# Patient Record
Sex: MALE | Race: OTHER | HISPANIC OR LATINO | URBAN - METROPOLITAN AREA
[De-identification: names, ages, dates, MRNs, and addresses within clinical notes are randomized per-mention and may not be internally consistent; named-entity substitution may affect disease eponyms.]

---

## 2020-12-07 RX ORDER — LISINOPRIL 20 MG/1
TABLET ORAL
Qty: 90 TABLET | Refills: 0 | OUTPATIENT
Start: 2020-12-07

## 2020-12-09 ENCOUNTER — TELEMEDICINE (OUTPATIENT)
Dept: FAMILY MEDICINE CLINIC | Facility: CLINIC | Age: 55
End: 2020-12-09
Payer: COMMERCIAL

## 2020-12-09 VITALS — WEIGHT: 285 LBS | BODY MASS INDEX: 43.19 KG/M2 | HEIGHT: 68 IN

## 2020-12-09 DIAGNOSIS — Z11.4 SCREENING FOR HIV WITHOUT PRESENCE OF RISK FACTORS: ICD-10-CM

## 2020-12-09 DIAGNOSIS — Z12.11 SCREENING FOR COLON CANCER: ICD-10-CM

## 2020-12-09 DIAGNOSIS — E55.9 VITAMIN D DEFICIENCY: ICD-10-CM

## 2020-12-09 DIAGNOSIS — Z13.220 SCREENING FOR LIPID DISORDERS: ICD-10-CM

## 2020-12-09 DIAGNOSIS — I10 ESSENTIAL HYPERTENSION: Primary | ICD-10-CM

## 2020-12-09 DIAGNOSIS — Z13.89 SCREENING FOR BLOOD OR PROTEIN IN URINE: ICD-10-CM

## 2020-12-09 PROCEDURE — 99213 OFFICE O/P EST LOW 20 MIN: CPT | Performed by: NURSE PRACTITIONER

## 2020-12-09 RX ORDER — LISINOPRIL 20 MG/1
20 TABLET ORAL DAILY
Qty: 90 TABLET | Refills: 1 | Status: SHIPPED | OUTPATIENT
Start: 2020-12-09 | End: 2021-05-12 | Stop reason: SDUPTHER

## 2020-12-09 RX ORDER — LISINOPRIL 20 MG/1
TABLET ORAL
Qty: 90 TABLET | Refills: 0 | OUTPATIENT
Start: 2020-12-09

## 2020-12-28 DIAGNOSIS — G47.33 OSA (OBSTRUCTIVE SLEEP APNEA): ICD-10-CM

## 2020-12-28 DIAGNOSIS — J34.3 HYPERTROPHY OF INFERIOR NASAL TURBINATE: ICD-10-CM

## 2020-12-28 DIAGNOSIS — J30.89 NON-SEASONAL ALLERGIC RHINITIS DUE TO OTHER ALLERGIC TRIGGER: ICD-10-CM

## 2020-12-28 DIAGNOSIS — J34.2 DEVIATED NASAL SEPTUM: ICD-10-CM

## 2020-12-28 DIAGNOSIS — R09.81 NASAL CONGESTION: ICD-10-CM

## 2020-12-31 RX ORDER — FLUTICASONE PROPIONATE 50 MCG
SPRAY, SUSPENSION (ML) NASAL
Qty: 16 G | Refills: 0 | Status: SHIPPED | OUTPATIENT
Start: 2020-12-31 | End: 2021-05-12 | Stop reason: SDUPTHER

## 2021-02-05 LAB
25(OH)D3 SERPL-MCNC: 32 NG/ML (ref 30–100)
ALBUMIN SERPL-MCNC: 3.8 G/DL (ref 3.6–5.1)
ALBUMIN/GLOB SERPL: 1.2 (CALC) (ref 1–2.5)
ALP SERPL-CCNC: 48 U/L (ref 35–144)
ALT SERPL-CCNC: 27 U/L (ref 9–46)
APPEARANCE UR: CLEAR
AST SERPL-CCNC: 31 U/L (ref 10–35)
BACTERIA UR QL AUTO: NORMAL /HPF
BASOPHILS # BLD AUTO: 13 CELLS/UL (ref 0–200)
BASOPHILS NFR BLD AUTO: 0.2 %
BILIRUB SERPL-MCNC: 0.8 MG/DL (ref 0.2–1.2)
BILIRUB UR QL STRIP: NEGATIVE
BUN SERPL-MCNC: 19 MG/DL (ref 7–25)
BUN/CREAT SERPL: NORMAL (CALC) (ref 6–22)
CALCIUM SERPL-MCNC: 8.7 MG/DL (ref 8.6–10.3)
CHLORIDE SERPL-SCNC: 107 MMOL/L (ref 98–110)
CHOLEST SERPL-MCNC: 153 MG/DL
CHOLEST/HDLC SERPL: 3.8 (CALC)
CO2 SERPL-SCNC: 26 MMOL/L (ref 20–32)
COLOR UR: YELLOW
CREAT SERPL-MCNC: 0.91 MG/DL (ref 0.7–1.33)
EOSINOPHIL # BLD AUTO: 191 CELLS/UL (ref 15–500)
EOSINOPHIL NFR BLD AUTO: 2.9 %
ERYTHROCYTE [DISTWIDTH] IN BLOOD BY AUTOMATED COUNT: 13.9 % (ref 11–15)
GLOBULIN SER CALC-MCNC: 3.1 G/DL (CALC) (ref 1.9–3.7)
GLUCOSE SERPL-MCNC: 92 MG/DL (ref 65–99)
GLUCOSE UR QL STRIP: NEGATIVE
HCT VFR BLD AUTO: 41 % (ref 38.5–50)
HDLC SERPL-MCNC: 40 MG/DL
HGB BLD-MCNC: 13.7 G/DL (ref 13.2–17.1)
HGB UR QL STRIP: NEGATIVE
HIV 1+2 AB+HIV1 P24 AG SERPL QL IA: NORMAL
HYALINE CASTS #/AREA URNS LPF: NORMAL /LPF
KETONES UR QL STRIP: NEGATIVE
LDLC SERPL CALC-MCNC: 99 MG/DL (CALC)
LEUKOCYTE ESTERASE UR QL STRIP: NEGATIVE
LYMPHOCYTES # BLD AUTO: 2845 CELLS/UL (ref 850–3900)
LYMPHOCYTES NFR BLD AUTO: 43.1 %
MCH RBC QN AUTO: 28.3 PG (ref 27–33)
MCHC RBC AUTO-ENTMCNC: 33.4 G/DL (ref 32–36)
MCV RBC AUTO: 84.7 FL (ref 80–100)
MONOCYTES # BLD AUTO: 772 CELLS/UL (ref 200–950)
MONOCYTES NFR BLD AUTO: 11.7 %
NEUTROPHILS # BLD AUTO: 2779 CELLS/UL (ref 1500–7800)
NEUTROPHILS NFR BLD AUTO: 42.1 %
NITRITE UR QL STRIP: NEGATIVE
NONHDLC SERPL-MCNC: 113 MG/DL (CALC)
PH UR STRIP: 5.5 [PH] (ref 5–8)
PLATELET # BLD AUTO: 251 THOUSAND/UL (ref 140–400)
PMV BLD REES-ECKER: 11.2 FL (ref 7.5–12.5)
POTASSIUM SERPL-SCNC: 3.7 MMOL/L (ref 3.5–5.3)
PROT SERPL-MCNC: 6.9 G/DL (ref 6.1–8.1)
PROT UR QL STRIP: NEGATIVE
RBC # BLD AUTO: 4.84 MILLION/UL (ref 4.2–5.8)
RBC #/AREA URNS HPF: NORMAL /HPF
SL AMB EGFR AFRICAN AMERICAN: 110 ML/MIN/1.73M2
SL AMB EGFR NON AFRICAN AMERICAN: 95 ML/MIN/1.73M2
SODIUM SERPL-SCNC: 141 MMOL/L (ref 135–146)
SP GR UR STRIP: 1.03 (ref 1–1.03)
SQUAMOUS #/AREA URNS HPF: NORMAL /HPF
TRIGL SERPL-MCNC: 56 MG/DL
WBC # BLD AUTO: 6.6 THOUSAND/UL (ref 3.8–10.8)
WBC #/AREA URNS HPF: NORMAL /HPF

## 2021-03-09 ENCOUNTER — TELEPHONE (OUTPATIENT)
Dept: FAMILY MEDICINE CLINIC | Facility: CLINIC | Age: 56
End: 2021-03-09

## 2021-03-09 DIAGNOSIS — J30.89 NON-SEASONAL ALLERGIC RHINITIS DUE TO OTHER ALLERGIC TRIGGER: ICD-10-CM

## 2021-03-09 DIAGNOSIS — J34.3 HYPERTROPHY OF INFERIOR NASAL TURBINATE: ICD-10-CM

## 2021-03-09 DIAGNOSIS — R09.81 NASAL CONGESTION: ICD-10-CM

## 2021-03-09 DIAGNOSIS — J34.2 DEVIATED NASAL SEPTUM: ICD-10-CM

## 2021-03-09 DIAGNOSIS — G47.33 OSA (OBSTRUCTIVE SLEEP APNEA): ICD-10-CM

## 2021-03-09 RX ORDER — CETIRIZINE HYDROCHLORIDE 10 MG/1
10 TABLET ORAL
Qty: 30 TABLET | Refills: 2 | Status: SHIPPED | OUTPATIENT
Start: 2021-03-09 | End: 2021-05-12 | Stop reason: SDUPTHER

## 2021-04-16 ENCOUNTER — IMMUNIZATIONS (OUTPATIENT)
Dept: FAMILY MEDICINE CLINIC | Facility: HOSPITAL | Age: 56
End: 2021-04-16

## 2021-04-16 DIAGNOSIS — Z23 ENCOUNTER FOR IMMUNIZATION: Primary | ICD-10-CM

## 2021-04-16 PROCEDURE — 91300 SARS-COV-2 / COVID-19 MRNA VACCINE (PFIZER-BIONTECH) 30 MCG: CPT

## 2021-04-16 PROCEDURE — 0001A SARS-COV-2 / COVID-19 MRNA VACCINE (PFIZER-BIONTECH) 30 MCG: CPT

## 2021-05-11 ENCOUNTER — IMMUNIZATIONS (OUTPATIENT)
Dept: FAMILY MEDICINE CLINIC | Facility: HOSPITAL | Age: 56
End: 2021-05-11

## 2021-05-11 DIAGNOSIS — Z23 ENCOUNTER FOR IMMUNIZATION: Primary | ICD-10-CM

## 2021-05-11 PROCEDURE — 91300 SARS-COV-2 / COVID-19 MRNA VACCINE (PFIZER-BIONTECH) 30 MCG: CPT

## 2021-05-11 PROCEDURE — 0002A SARS-COV-2 / COVID-19 MRNA VACCINE (PFIZER-BIONTECH) 30 MCG: CPT

## 2021-05-12 ENCOUNTER — TELEPHONE (OUTPATIENT)
Dept: FAMILY MEDICINE CLINIC | Facility: CLINIC | Age: 56
End: 2021-05-12

## 2021-05-12 DIAGNOSIS — G47.33 OSA (OBSTRUCTIVE SLEEP APNEA): ICD-10-CM

## 2021-05-12 DIAGNOSIS — I10 ESSENTIAL HYPERTENSION: ICD-10-CM

## 2021-05-12 DIAGNOSIS — R09.81 NASAL CONGESTION: ICD-10-CM

## 2021-05-12 DIAGNOSIS — J34.2 DEVIATED NASAL SEPTUM: ICD-10-CM

## 2021-05-12 DIAGNOSIS — J30.89 NON-SEASONAL ALLERGIC RHINITIS DUE TO OTHER ALLERGIC TRIGGER: ICD-10-CM

## 2021-05-12 DIAGNOSIS — E55.9 VITAMIN D DEFICIENCY: Primary | ICD-10-CM

## 2021-05-12 DIAGNOSIS — J34.3 HYPERTROPHY OF INFERIOR NASAL TURBINATE: ICD-10-CM

## 2021-05-12 RX ORDER — LISINOPRIL 20 MG/1
20 TABLET ORAL DAILY
Qty: 90 TABLET | Refills: 2 | Status: SHIPPED | OUTPATIENT
Start: 2021-05-12 | End: 2021-10-31

## 2021-05-12 RX ORDER — CETIRIZINE HYDROCHLORIDE 10 MG/1
10 TABLET ORAL
Qty: 30 TABLET | Refills: 5 | Status: SHIPPED | OUTPATIENT
Start: 2021-05-12 | End: 2022-01-25

## 2021-05-12 RX ORDER — FLUTICASONE PROPIONATE 50 MCG
1 SPRAY, SUSPENSION (ML) NASAL DAILY
Qty: 16 G | Refills: 2 | Status: SHIPPED | OUTPATIENT
Start: 2021-05-12 | End: 2022-07-15 | Stop reason: SDUPTHER

## 2021-05-12 NOTE — TELEPHONE ENCOUNTER
Spoke with patient and gave results for cologard test  Patient wants to know since he has history of colon polyps and this recent test was negative, how long until he goes back for another colonoscopy? When I call patient back I need to obtain the old records, there were none on file for old colonoscopy

## 2021-10-29 DIAGNOSIS — I10 ESSENTIAL HYPERTENSION: ICD-10-CM

## 2021-10-31 RX ORDER — LISINOPRIL 20 MG/1
TABLET ORAL
Qty: 90 TABLET | Refills: 0 | Status: SHIPPED | OUTPATIENT
Start: 2021-10-31 | End: 2022-07-15 | Stop reason: SDUPTHER

## 2022-01-25 DIAGNOSIS — G47.33 OSA (OBSTRUCTIVE SLEEP APNEA): ICD-10-CM

## 2022-01-25 DIAGNOSIS — J34.3 HYPERTROPHY OF INFERIOR NASAL TURBINATE: ICD-10-CM

## 2022-01-25 DIAGNOSIS — J30.89 NON-SEASONAL ALLERGIC RHINITIS DUE TO OTHER ALLERGIC TRIGGER: ICD-10-CM

## 2022-01-25 DIAGNOSIS — R09.81 NASAL CONGESTION: ICD-10-CM

## 2022-01-25 DIAGNOSIS — J34.2 DEVIATED NASAL SEPTUM: ICD-10-CM

## 2022-01-25 DIAGNOSIS — I10 ESSENTIAL HYPERTENSION: ICD-10-CM

## 2022-01-25 RX ORDER — CETIRIZINE HYDROCHLORIDE 10 MG/1
TABLET ORAL
Qty: 30 TABLET | Refills: 0 | Status: SHIPPED | OUTPATIENT
Start: 2022-01-25

## 2022-01-25 RX ORDER — CETIRIZINE HYDROCHLORIDE 10 MG/1
10 TABLET ORAL
Qty: 30 TABLET | Refills: 5 | OUTPATIENT
Start: 2022-01-25

## 2022-06-28 ENCOUNTER — TELEPHONE (OUTPATIENT)
Dept: FAMILY MEDICINE CLINIC | Facility: CLINIC | Age: 57
End: 2022-06-28

## 2022-06-28 NOTE — TELEPHONE ENCOUNTER
Pt's wife and called again, NAHED Meza had called to let her know that  Because pt has not been seen in 1 5 yrs, and no labs that he needs an OV  Pt's wife states that he is [place] on business and not sure when he'd be returning  After speaking with Blaze Enrique pt's wife that pt should seek medical attention down in [place]

## 2022-06-28 NOTE — TELEPHONE ENCOUNTER
Wife called patient is out of town and out of medication  He needs refills on lisinopril  Cetirizine and nose spray   Sent to [facility]  [phone]

## 2022-07-11 ENCOUNTER — TELEPHONE (OUTPATIENT)
Dept: FAMILY MEDICINE CLINIC | Facility: CLINIC | Age: 57
End: 2022-07-11

## 2022-07-11 NOTE — TELEPHONE ENCOUNTER
Patient has an appt   For Friday and would like to get blood work done ahead of time if he needs it done

## 2022-07-12 RX ORDER — CHLORHEXIDINE GLUCONATE ORAL RINSE 1.2 MG/ML
SOLUTION DENTAL
COMMUNITY
Start: 2022-04-19

## 2022-07-14 NOTE — PROGRESS NOTES
[facility]    NAME: Wesley West  AGE: 56 y o  SEX: male  : 1965     DATE: 7/15/2022     Assessment and Plan:     Problem List Items Addressed This Visit        Respiratory    Deviated nasal septum     Patient reports history of deviated septum  Concerns for sleep apnea sleep study ordered at this time for follow-up  Relevant Medications    fluticasone (FLONASE) 50 mcg/act nasal spray    Hypertrophy of inferior nasal turbinate     Patient re-evaluate for nasal pillows for CPAP machine  Relevant Medications    fluticasone (FLONASE) 50 mcg/act nasal spray    LEAH (obstructive sleep apnea)     Prior history of using CPAP mask for sleep apnea  Consideration for bilateral nasal pillows at this time  Sleep Consul ordered  Relevant Medications    fluticasone (FLONASE) 50 mcg/act nasal spray    Allergic rhinitis due to allergen     Patient order for Flonase nasal spray 1 spray each nostril daily during change of season  Maintain Zyrtec 10 mg p o  Q h s  During changes season as well  Allergy triggers to be avoided  Relevant Medications    fluticasone (FLONASE) 50 mcg/act nasal spray       Cardiovascular and Mediastinum    Essential hypertension     Blood pressure currently 120/80  Patient currently reordered for lisinopril 20 mg p o  Daily  Patient maintain a low-sodium diet as well as exercise 3-5 times per week with moderate cardiovascular activity for 75 minutes  Recheck BP next office visit  Relevant Medications    lisinopril (ZESTRIL) 20 mg tablet       Other    Screening, lipid - Primary     Screening lipids ordered at this time  Patient currently taking cholesteroff over-the-counter herbal supplement to reduce cholesterol  Maintain low-fat low-cholesterol diet routinely  Exercise encouraged 3-5 times per week for 75 minutes of cardiovascular activity           Relevant Orders Lipid panel    Need for hepatitis C screening test     Baseline hepatitis C screening for low risk patients ordered at this time  Relevant Orders    Hepatitis C Antibody ([facility])    Snoring     Known history of snoring Sleep Medicine consult ordered at this time  Relevant Orders    Ambulatory Referral to Sleep Medicine    Exam for population survey     CBC with diff and CMP ordered  Relevant Orders    CBC and differential    Comprehensive metabolic panel    Nasal congestion     Flonase nasal spray ordered for routine seasonal rhinitis  Relevant Medications    fluticasone (FLONASE) 50 mcg/act nasal spray          Immunizations and preventive care screenings were discussed with patient today  Appropriate education was printed on patient's after visit summary  Counseling:  Alcohol/drug use: discussed moderation in alcohol intake, the recommendations for healthy alcohol use, and avoidance of illicit drug use  Dental Health: discussed importance of regular tooth brushing, flossing, and dental visits  Injury prevention: discussed safety/seat belts, safety helmets, smoke detectors, carbon dioxide detectors, and smoking near bedding or upholstery  Sexual health: discussed sexually transmitted diseases, partner selection, use of condoms, avoidance of unintended pregnancy, and contraceptive alternatives  · Exercise: the importance of regular exercise/physical activity was discussed  Recommend exercise 3-5 times per week for at least 30 minutes  Depression Screening and Follow-up Plan: Patient was screened for depression during today's encounter  They screened negative with a PHQ-2 score of 0  No follow-ups on file       Chief Complaint:     Chief Complaint   Patient presents with    Follow-up    Annual Exam      History of Present Illness:     Adult Annual Physical   Patient here for a comprehensive physical exam  The patient reports problems - history sleep apnea with snoring, feel unrested       Diet and Physical Activity  · Diet/Nutrition: well balanced diet, heart healthy (low sodium) diet, limited junk food, low calorie diet, low fat diet, low carb diet, consuming 3-5 servings of fruits/vegetables daily, adequate fiber intake and adequate whole grain intake  · Exercise: no formal exercise  Depression Screening  PHQ-2/9 Depression Screening    Little interest or pleasure in doing things: 0 - not at all  Feeling down, depressed, or hopeless: 0 - not at all  PHQ-2 Score: 0  PHQ-2 Interpretation: Negative depression screen       General Health  · Sleep: sleeps well, gets 7-8 hours of sleep on average, snores loudly, experiences daytime hypersomnolence and unrefreshing sleep  · Hearing: normal - bilateral   · Vision: goes for regular eye exams, most recent eye exam >1 year ago and wears glasses  · Dental: regular dental visits, brushes teeth twice daily and flosses teeth occasionally   Health  · Symptoms include: none     Review of Systems:     Review of Systems   Constitutional: Negative for activity change, appetite change, chills, fatigue, fever and unexpected weight change  HENT: Negative for congestion, ear discharge, ear pain, nosebleeds, postnasal drip, rhinorrhea, sinus pressure, sinus pain, sneezing, sore throat and voice change  Eyes: Negative for pain, redness and visual disturbance  Respiratory: Negative for cough, chest tightness, shortness of breath and wheezing  Cardiovascular: Negative for chest pain and palpitations  Gastrointestinal: Negative for abdominal distention, abdominal pain, constipation, diarrhea, nausea and vomiting  Endocrine: Negative  Genitourinary: Negative for difficulty urinating, dysuria, flank pain, frequency, hematuria and urgency  Musculoskeletal: Negative for arthralgias and myalgias  Skin: Negative  Allergic/Immunologic: Negative  Neurological: Negative  Hematological: Negative  Psychiatric/Behavioral: Negative         Past Medical History:     Past Medical History:   Diagnosis Date    High blood pressure     Hypertension       Past Surgical History:     Past Surgical History:   Procedure Laterality Date    HERNIA REPAIR      WISDOM TOOTH EXTRACTION        Family History:     Family History   Problem Relation Age of Onset    Heart disease Other     Hypertension Other     Diabetes Other     Cancer Other     Dementia Other     Diabetes Mother     Alzheimer's disease Mother     Dementia Mother     Diabetes Father     Hypertension Father       Social History:     Social History     Socioeconomic History    Marital status: /Civil Union     Spouse name: None    Number of children: 2    Years of education: None    Highest education level: None   Occupational History    Occupation: Investigator    Tobacco Use    Smoking status: Never Smoker    Smokeless tobacco: Never Used   Vaping Use    Vaping Use: Never used   Substance and Sexual Activity    Alcohol use: Yes     Comment: socially    Drug use: Never    Sexual activity: None   Other Topics Concern    None   Social History Narrative    · Most recent tobacco use screenin2019      · Do you currently or have you served in the [employer]:   No      · Were you activated, into active duty, as a member of the [employer] or as a Reservist:   No     As per [employer] [facility] [employer]     Financial Resource Strain: Not on [facility] Insecurity: Not on file   Transportation Needs: Not on file   Physical Activity: Not on file   Stress: Not on file   Social Connections: Not on file   Intimate Partner Violence: Not on file   Housing Stability: Not on file      Current Medications:     Current Outpatient Medications   Medication Sig Dispense Refill    cetirizine (ZyrTEC) 10 mg tablet TAKE 1 TABLET BY MOUTH ONCE DAILY AT BEDTIME 30 tablet 0    chlorhexidine (PERIDEX) 0 12 % solution RINSE 30 SECONDS TWICE DAILY AFTER BRUSHING      fluticasone (FLONASE) 50 mcg/act nasal spray 1 spray into each nostril daily 16 g 2    lisinopril (ZESTRIL) 20 mg tablet Take 1 tablet (20 mg total) by mouth daily 90 tablet 3    Cholecalciferol (Vitamin D3) 125 MCG (5000 UT) CHEW Chew 0 4 tablets (2,000 Units total) daily (Patient not taking: Reported on 7/15/2022) 90 tablet 2     No current facility-administered medications for this visit  Allergies:     No Known Allergies   Physical Exam:     There were no vitals taken for this visit  Physical Exam  Vitals and nursing note reviewed  Constitutional:       Appearance: Normal appearance  He is well-developed and normal weight  HENT:      Head: Normocephalic and atraumatic  Right Ear: Ear canal and external ear normal  There is impacted cerumen  Left Ear: Ear canal and external ear normal  There is impacted cerumen  Nose: Nose normal  No congestion or rhinorrhea  Mouth/Throat:      Mouth: Mucous membranes are moist    Eyes:      Extraocular Movements: Extraocular movements intact  Conjunctiva/sclera: Conjunctivae normal       Pupils: Pupils are equal, round, and reactive to light  Cardiovascular:      Rate and Rhythm: Normal rate and regular rhythm  Pulses: Normal pulses  Heart sounds: Normal heart sounds  No murmur heard  Pulmonary:      Effort: Pulmonary effort is normal  No respiratory distress  Breath sounds: Normal breath sounds  Abdominal:      General: Bowel sounds are normal       Palpations: Abdomen is soft  Tenderness: There is no abdominal tenderness  Musculoskeletal:         General: Normal range of motion  Cervical back: Normal range of motion and neck supple  Skin:     General: Skin is warm and dry  Capillary Refill: Capillary refill takes 2 to 3 seconds  Neurological:      General: No focal deficit present  Mental Status: He is alert and oriented to person, place, and time  Psychiatric:         Mood and Affect: Mood normal          Behavior: Behavior normal           Marcella [facility]

## 2022-07-15 ENCOUNTER — OFFICE VISIT (OUTPATIENT)
Dept: FAMILY MEDICINE CLINIC | Facility: CLINIC | Age: 57
End: 2022-07-15
Payer: COMMERCIAL

## 2022-07-15 DIAGNOSIS — Z12.11 COLON CANCER SCREENING: ICD-10-CM

## 2022-07-15 DIAGNOSIS — Z13.29 SCREENING FOR THYROID DISORDER: ICD-10-CM

## 2022-07-15 DIAGNOSIS — I10 ESSENTIAL HYPERTENSION: ICD-10-CM

## 2022-07-15 DIAGNOSIS — G47.33 OSA (OBSTRUCTIVE SLEEP APNEA): ICD-10-CM

## 2022-07-15 DIAGNOSIS — Z23 ENCOUNTER FOR IMMUNIZATION: ICD-10-CM

## 2022-07-15 DIAGNOSIS — Z13.89 SCREENING FOR BLOOD OR PROTEIN IN URINE: ICD-10-CM

## 2022-07-15 DIAGNOSIS — J34.2 DEVIATED NASAL SEPTUM: ICD-10-CM

## 2022-07-15 DIAGNOSIS — J30.89 NON-SEASONAL ALLERGIC RHINITIS DUE TO OTHER ALLERGIC TRIGGER: ICD-10-CM

## 2022-07-15 DIAGNOSIS — Z00.8 EXAM FOR POPULATION SURVEY: ICD-10-CM

## 2022-07-15 DIAGNOSIS — Z11.59 NEED FOR HEPATITIS C SCREENING TEST: ICD-10-CM

## 2022-07-15 DIAGNOSIS — Z00.00 ANNUAL PHYSICAL EXAM: Primary | ICD-10-CM

## 2022-07-15 DIAGNOSIS — R06.83 SNORING: ICD-10-CM

## 2022-07-15 DIAGNOSIS — R09.81 NASAL CONGESTION: ICD-10-CM

## 2022-07-15 DIAGNOSIS — J34.3 HYPERTROPHY OF INFERIOR NASAL TURBINATE: ICD-10-CM

## 2022-07-15 DIAGNOSIS — Z13.220 SCREENING, LIPID: ICD-10-CM

## 2022-07-15 PROBLEM — J30.9 ALLERGIC RHINITIS DUE TO ALLERGEN: Status: ACTIVE | Noted: 2022-07-15

## 2022-07-15 PROCEDURE — 3725F SCREEN DEPRESSION PERFORMED: CPT | Performed by: NURSE PRACTITIONER

## 2022-07-15 PROCEDURE — 90471 IMMUNIZATION ADMIN: CPT | Performed by: NURSE PRACTITIONER

## 2022-07-15 PROCEDURE — 99215 OFFICE O/P EST HI 40 MIN: CPT | Performed by: NURSE PRACTITIONER

## 2022-07-15 PROCEDURE — 90715 TDAP VACCINE 7 YRS/> IM: CPT | Performed by: NURSE PRACTITIONER

## 2022-07-15 PROCEDURE — 99396 PREV VISIT EST AGE 40-64: CPT | Performed by: NURSE PRACTITIONER

## 2022-07-15 RX ORDER — LISINOPRIL 20 MG/1
20 TABLET ORAL DAILY
Qty: 90 TABLET | Refills: 3 | Status: SHIPPED | OUTPATIENT
Start: 2022-07-15

## 2022-07-15 RX ORDER — FLUTICASONE PROPIONATE 50 MCG
1 SPRAY, SUSPENSION (ML) NASAL DAILY
Qty: 16 G | Refills: 2 | Status: SHIPPED | OUTPATIENT
Start: 2022-07-15

## 2022-07-15 NOTE — PATIENT INSTRUCTIONS
Hypertension   WHAT YOU NEED TO KNOW:   What is hypertension? Hypertension is high blood pressure  Your blood pressure is the force of your blood moving against the walls of your arteries  Hypertension causes your blood pressure to get so high that your heart has to work much harder than normal  This can damage your heart  The cause of hypertension may not be known  This is called essential or primary hypertension  Hypertension caused by another medical condition, such as kidney disease, is called secondary hypertension  What do I need to know about the stages of hypertension? Normal blood pressure is 119/79 or lower   Your healthcare provider may only check your blood pressure each year if it stays at a normal level  Elevated blood pressure is 120/79 to 129/79   This is sometimes called prehypertension  Your healthcare provider may suggest lifestyle changes to help lower your blood pressure to a normal level  He or she may then check it again in 3 to 6 months  Stage 1 hypertension is 130/80  to 139/89   Your provider may recommend lifestyle changes, medication, and checks every 3 to 6 months until your blood pressure is controlled  Stage 2 hypertension is 140/90 or higher   Your provider will recommend lifestyle changes and have you take 2 kinds of hypertension medicines  You will also need to have your blood pressure checked monthly until it is controlled  What increases my risk for hypertension? Age older than 54 years (men) or 72 (women)    Stress, or a family history of hypertension or heart disease    Obesity, lack of exercise, or too many high-sodium foods    Use of tobacco, alcohol, or illegal drugs    A medical condition, such as diabetes, kidney disease, thyroid disease, or adrenal gland disorder    Certain medicines, such as steroids or birth control pills    What are the signs and symptoms of hypertension?   You may have no signs or symptoms, or you may have any of the following:  Headache    Blurred vision    Chest pain    Dizziness or weakness    Trouble breathing    Nosebleeds    How is hypertension diagnosed? Your healthcare provider will take your blood pressure at several visits  You may also need to check your blood pressure at home  The provider will examine you and ask what medicines you take  He or she will also ask if you have a family history of high blood pressure and about any health conditions you have  He or she will also check your blood pressure and weight and examine your heart, lungs, and eyes  You may need any of the following tests: An ambulatory blood pressure monitor (ABPM)  is a device that you wear  ABPM measures your blood pressure while you do your regular daily activities  It records your blood pressure every 15 to 30 minutes during the day  It also records your blood pressure every 15 minutes to 1 hour at night  The recorded blood pressures help your healthcare provider know if you have hypertension not seen at your appointment  Blood tests  may help healthcare providers find the cause of your hypertension  Blood tests can also help find other health problems caused by hypertension  Urine tests  will be done to check your kidney function  Kidney problems can increase your risk for hypertension  Which medicines are used to treat hypertension? Antihypertensives  may be used to help lower your blood pressure  Several kinds of medicines are available  Your healthcare provider will choose medicines based on the kind of hypertension you have  You may need more than one type of medicine  Take the medicine exactly as directed  Diuretics  help decrease extra fluid that collects in your body  This will help lower your blood pressure  You may urinate more often while you take this medicine  Cholesterol medicine  helps lower your cholesterol level   A low cholesterol level helps prevent heart disease and makes it easier to control your blood pressure  What can I do to manage hypertension? Check your blood pressure at home  Avoid smoking, caffeine, and exercise at least 30 minutes before checking your blood pressure  Sit and rest for 5 minutes before you take your blood pressure  Extend your arm and support it on a flat surface  Your arm should be at the same level as your heart  Follow the directions that came with your blood pressure monitor  Check your blood pressure 2 times, 1 minute apart, before you take your medicine in the morning  Also check your blood pressure before your evening meal  Keep a record of your readings and bring it to your follow-up visits  Ask your healthcare provider what your blood pressure should be  Manage any other health conditions you have  Health conditions such as diabetes can increase your risk for hypertension  Follow your healthcare provider's instructions and take all your medicines as directed  Ask about all medicines  Certain medicines can increase your blood pressure  Examples include oral birth control pills, decongestants, herbal supplements, and NSAIDs, such as ibuprofen  Your healthcare provider can tell you which medicines are safe for you to take  This includes prescription and over-the-counter medicines  What lifestyle changes can I make to manage hypertension? Your healthcare provider may recommend you work with a team to manage hypertension  The team may include medical experts such as a dietitian, an exercise or physical therapist, and a behavior therapist  Your family members may be included in helping you create lifestyle changes  Limit sodium (salt) as directed  Too much sodium can affect your fluid balance  Check labels to find low-sodium or no-salt-added foods  Some low-sodium foods use potassium salts for flavor  Too much potassium can also cause health problems  Your healthcare provider will tell you how much sodium and potassium are safe for you to have in a day   He or she may recommend that you limit sodium to 2,300 mg a day  Follow the meal plan recommended by your healthcare provider  A dietitian or your provider can give you more information on low-sodium plans or the DASH (Dietary Approaches to Stop Hypertension) eating plan  The DASH plan is low in sodium, processed sugar, unhealthy fats, and total fat  It is high in potassium, calcium, and fiber  These can be found in vegetables, fruit, and whole-grain foods  Be physically active throughout the day  Physical activity, such as exercise, can help control your blood pressure and your weight  Be physically active for at least 30 minutes per day, on most days of the week  Include aerobic activity, such as walking or riding a bicycle  Also include strength training at least 2 times each week  Your healthcare providers can help you create a physical activity plan  Decrease stress  This may help lower your blood pressure  Learn ways to relax, such as deep breathing or listening to music  Limit alcohol as directed  Alcohol can increase your blood pressure  A drink of alcohol is 12 ounces of beer, 5 ounces of wine, or 1½ ounces of liquor  Do not smoke  Nicotine and other chemicals in cigarettes and cigars can increase your blood pressure and also cause lung damage  Ask your healthcare provider for information if you currently smoke and need help to quit  E-cigarettes or smokeless tobacco still contain nicotine  Talk to your healthcare provider before you use these products  Call your local emergency number [phone] in the [address] [facility]) or have someone call if:   You have chest pain      You have any of the following signs of a heart attack:      Squeezing, pressure, or pain in your chest    You may  also have any of the following:     Discomfort or pain in your back, neck, jaw, stomach, or arm    Shortness of breath    Nausea or vomiting    Lightheadedness or a sudden cold sweat    You become confused or have trouble speaking  You suddenly feel lightheaded or have trouble breathing  When should I seek immediate care? You have a severe headache or vision loss  You have weakness in an arm or leg  When should I call my doctor? You feel faint, dizzy, confused, or drowsy  You have been taking your blood pressure medicine but your pressure is higher than your provider says it should be  You have questions or concerns about your condition or care  CARE AGREEMENT:   You have the right to help plan your care  Learn about your health condition and how it may be treated  Discuss treatment options with your healthcare providers to decide what care you want to receive  You always have the right to refuse treatment  The above information is an  only  It is not intended as medical advice for individual conditions or treatments  Talk to your doctor, nurse or pharmacist before following any medical regimen to see if it is safe and effective for you  © [employer] 2022 Information is for End User's use only and may not be sold, redistributed or otherwise used for commercial purposes  All illustrations and images included in CareNotes® are the copyrighted property of [employer]  or [facility] Lucia Cleary   Sleep Apnea   WHAT [place]:   What is sleep apnea? Sleep apnea is a condition that causes you to stop breathing for seconds or minutes at a time  This can happen many times during the night  What are the types of sleep apnea? Obstructive sleep apnea (LEAH)  is the most common kind  The muscles and tissues around your throat relax and block air from passing through  Obesity, use of alcohol or cigarettes, or a family history are common causes  LEAH may increase your risk for complications after surgery  Central sleep apnea (CSA)  means your brain does not send signals to the muscles that control breathing  You do not take a breath even though your airway is open  Common causes include medical conditions such as heart failure, being older than 40, or use of opioids  Complex (or mixed) sleep apnea  means you have both obstructive and central sleep apnea  What are the signs and symptoms of sleep apnea? Loud snoring or long pauses in breathing    Feeling sleepy, slow, and tired during the day    Snorting, gasping, or choking while you sleep, and waking up suddenly because of these    Feeling irritable during the day    Dry mouth or a headache in the mornings    Heavy night sweating    A hard time thinking, remembering things, or focusing on your tasks the following day    How is sleep apnea diagnosed? Your healthcare provider will ask about your signs and symptoms  Tell him or her about your medical history and what medicines you take  Your provider may ask if you smoke or if anyone in your family has sleep apnea  he or she may ask the person who sleeps beside you about your signs  You may need any of the following:  A home sleep test  measures your heart rate, blood oxygen level, and breathing patterns  You wear a device on your finger while you sleep  A sleep study at a facility may be needed  Your blood oxygen levels, movements, and heart, lung, and brain activity are monitored  How is sleep apnea treated? Treatment depends on the kind of apnea you have:  A mouth device  may be needed if you have mild sleep apnea  These are designed to keep your throat open  Ask your dentist or healthcare provider about the best mouth device for you  A machine  may be used to help you get more air during sleep  A mask may be placed over your nose and mouth, or just your nose  The mask is hooked to the machine  You will get air through the mask  A continuous positive airway pressure (CPAP) machine  is used to keep your airway open during sleep  The machine blows a gentle stream of air into the mask when you breathe   This helps keep your airway open so you can breathe more regularly  Extra oxygen may be given through the machine  A bilevel positive airway pressure (BiPAP) machine  gives air but lowers the pressure when you breathe out  An adaptive servo-ventilator (ASV)  is a machine that learns your usual breathing pattern  Then, it uses pressure to give you air and prevent stops in your breathing  Surgery  to expand your airway or remove extra tissues may be needed  Surgery is usually only considered if other treatments do not work  How can I manage or prevent sleep apnea? Reach and maintain a healthy weight  Ask your healthcare provider what a healthy weight is for you  Ask him or her to help you create a safe weight loss plan if you are overweight  Even a small goal of a 10% weight loss can improve your symptoms  Do not smoke  Nicotine and other chemicals in cigarettes and cigars can cause lung damage  Ask your healthcare provider for information if you currently smoke and need help to quit  E-cigarettes or smokeless tobacco still contain nicotine  Talk to your healthcare provider before you use these products  Do not drink alcohol or take sedative medicine before you go to sleep  Alcohol and sedatives can relax the muscles and tissues around your throat  This can block the airflow to your lungs  Sleep on your side or use pillows designed to prevent sleep apnea  This prevents your tongue or other tissues from blocking your throat  You can also raise the head of your bed  Call your local emergency number (911 in the [address] [facility]) if:   You have chest pain or trouble breathing  When should I call my doctor? You have new or worsening signs or symptoms  You have questions or concerns about your condition or care  CARE AGREEMENT:   You have the right to help plan your care  Learn about your health condition and how it may be treated  Discuss treatment options with your healthcare providers to decide what care you want to receive   You always have the right to refuse treatment  The above information is an  only  It is not intended as medical advice for individual conditions or treatments  Talk to your doctor, nurse or pharmacist before following any medical regimen to see if it is safe and effective for you  © Copyright [employer] 2022 Information is for End User's use only and may not be sold, redistributed or otherwise used for commercial purposes   All illustrations and images included in CareNotes® are the copyrighted property of [employer]  or [address] [place] [address]

## 2022-07-15 NOTE — ASSESSMENT & PLAN NOTE
Screening lipids ordered at this time  Patient currently taking cholesteroff over-the-counter herbal supplement to reduce cholesterol  Maintain low-fat low-cholesterol diet routinely  Exercise encouraged 3-5 times per week for 75 minutes of cardiovascular activity

## 2022-07-15 NOTE — ASSESSMENT & PLAN NOTE
Patient order for Flonase nasal spray 1 spray each nostril daily during change of season  Maintain Zyrtec 10 mg p o  Q h s  During changes season as well  Allergy triggers to be avoided

## 2022-07-15 NOTE — PROGRESS NOTES
Depression Screening and Follow-up Plan: Patient was screened for depression during today's encounter  They screened negative with a PHQ-2 score of 0  Assessment/Plan:         Problem List Items Addressed This Visit        Respiratory    Deviated nasal septum     Patient reports history of deviated septum  Concerns for sleep apnea sleep study ordered at this time for follow-up  Relevant Medications    fluticasone (FLONASE) 50 mcg/act nasal spray    Hypertrophy of inferior nasal turbinate     Patient re-evaluate for nasal pillows for CPAP machine  Relevant Medications    fluticasone (FLONASE) 50 mcg/act nasal spray    LEAH (obstructive sleep apnea)     Prior history of using CPAP mask for sleep apnea  Consideration for bilateral nasal pillows at this time  Sleep Consul ordered  Relevant Medications    fluticasone (FLONASE) 50 mcg/act nasal spray    Allergic rhinitis due to allergen     Patient order for Flonase nasal spray 1 spray each nostril daily during change of season  Maintain Zyrtec 10 mg p o  Q h s  During changes season as well  Allergy triggers to be avoided  Relevant Medications    fluticasone (FLONASE) 50 mcg/act nasal spray       Cardiovascular and Mediastinum    Essential hypertension     Blood pressure currently 120/80  Patient currently reordered for lisinopril 20 mg p o  Daily  Patient maintain a low-sodium diet as well as exercise 3-5 times per week with moderate cardiovascular activity for 75 minutes  Recheck BP next office visit  Relevant Medications    lisinopril (ZESTRIL) 20 mg tablet       Other    Screening, lipid - Primary     Screening lipids ordered at this time  Patient currently taking cholesteroff over-the-counter herbal supplement to reduce cholesterol  Maintain low-fat low-cholesterol diet routinely  Exercise encouraged 3-5 times per week for 75 minutes of cardiovascular activity           Relevant Orders    Lipid panel    Need for hepatitis C screening test     Baseline hepatitis C screening for low risk patients ordered at this time  Relevant Orders    Hepatitis C Antibody ([facility])    Snoring     Known history of snoring Sleep Medicine consult ordered at this time  Relevant Orders    Ambulatory Referral to Sleep Medicine    Exam for population survey     CBC with diff and CMP ordered  Relevant Orders    CBC and differential    Comprehensive metabolic panel    Nasal congestion     Flonase nasal spray ordered for routine seasonal rhinitis  Relevant Medications    fluticasone (FLONASE) 50 mcg/act nasal spray            Subjective:      Patient ID: Georgie Doss is a 56 y o  male  Patient is a 56year old male present with history of hypertension, obesity and sleep apnea  The following portions of the patient's history were reviewed and updated as appropriate:   Past Medical History:  He has a past medical history of High blood pressure and Hypertension  ,  _______________________________________________________________________  Medical Problems:  does not have any pertinent problems on file ,  _______________________________________________________________________  Past Surgical History:   has a past surgical history that includes Hernia repair and [place] tooth extraction  ,  _______________________________________________________________________  Family History:  family history includes Alzheimer's disease in his mother; Cancer in his other; Dementia in his mother and other; Diabetes in his father, mother, and other; Heart disease in his other; Hypertension in his father and other ,  _______________________________________________________________________  Social History:   reports that he has never smoked  He has never used smokeless tobacco  He reports current alcohol use   He reports that he does not use drugs ,  _______________________________________________________________________  Allergies:  has No Known Allergies     _______________________________________________________________________  Current Outpatient Medications   Medication Sig Dispense Refill    cetirizine (ZyrTEC) 10 mg tablet TAKE 1 TABLET BY MOUTH ONCE DAILY AT BEDTIME 30 tablet 0    chlorhexidine (PERIDEX) 0 12 % solution RINSE 30 SECONDS TWICE DAILY AFTER BRUSHING      fluticasone (FLONASE) 50 mcg/act nasal spray 1 spray into each nostril daily 16 g 2    lisinopril (ZESTRIL) 20 mg tablet Take 1 tablet (20 mg total) by mouth daily 90 tablet 3    Cholecalciferol (Vitamin D3) 125 MCG (5000 UT) CHEW Chew 0 4 tablets (2,000 Units total) daily (Patient not taking: Reported on 7/15/2022) 90 tablet 2     No current facility-administered medications for this visit      _______________________________________________________________________  Review of Systems   Constitutional: Negative for activity change, appetite change, chills, fatigue, fever and unexpected weight change  HENT: Negative for congestion, ear discharge, ear pain, nosebleeds, postnasal drip, rhinorrhea, sinus pressure, sinus pain, sneezing, sore throat and voice change  Eyes: Negative for pain, redness and visual disturbance  Respiratory: Negative for cough, chest tightness, shortness of breath and wheezing  Cardiovascular: Negative for chest pain and palpitations  Gastrointestinal: Negative for abdominal distention, abdominal pain, constipation, diarrhea, nausea and vomiting  Endocrine: Negative  Genitourinary: Negative for difficulty urinating, dysuria, flank pain, frequency, hematuria and urgency  Musculoskeletal: Negative for arthralgias and myalgias  Skin: Negative  Allergic/Immunologic: Negative  Neurological: Negative  Hematological: Negative  Psychiatric/Behavioral: Negative  Objective: There were no vitals filed for this visit    There is no height or weight on file to calculate BMI  Physical Exam  Vitals and nursing note reviewed  Constitutional:       Appearance: Normal appearance  He is well-developed  He is obese  HENT:      Head: Normocephalic and atraumatic  Right Ear: Tympanic membrane, ear canal and external ear normal       Left Ear: Tympanic membrane, ear canal and external ear normal       Nose: Nose normal  No congestion or rhinorrhea  Mouth/Throat:      Mouth: Mucous membranes are moist       Pharynx: No oropharyngeal exudate or posterior oropharyngeal erythema  Eyes:      Extraocular Movements: Extraocular movements intact  Conjunctiva/sclera: Conjunctivae normal       Pupils: Pupils are equal, round, and reactive to light  Cardiovascular:      Rate and Rhythm: Normal rate and regular rhythm  Pulses: Normal pulses  Heart sounds: Normal heart sounds  No murmur heard  Pulmonary:      Effort: Pulmonary effort is normal       Breath sounds: Normal breath sounds  Abdominal:      General: Bowel sounds are normal       Palpations: Abdomen is soft  Musculoskeletal:         General: Normal range of motion  Cervical back: Normal range of motion  Skin:     General: Skin is warm  Capillary Refill: Capillary refill takes less than 2 seconds  Neurological:      General: No focal deficit present  Mental Status: He is alert and oriented to person, place, and time     Psychiatric:         Mood and Affect: Mood normal          Behavior: Behavior normal

## 2022-07-15 NOTE — ASSESSMENT & PLAN NOTE
Prior history of using CPAP mask for sleep apnea  Consideration for bilateral nasal pillows at this time  Sleep Consul ordered

## 2022-07-15 NOTE — ASSESSMENT & PLAN NOTE
Blood pressure currently 120/80  Patient currently reordered for lisinopril 20 mg p o  Daily  Patient maintain a low-sodium diet as well as exercise 3-5 times per week with moderate cardiovascular activity for 75 minutes  Recheck BP next office visit

## 2022-07-15 NOTE — ASSESSMENT & PLAN NOTE
Patient reports history of deviated septum  Concerns for sleep apnea sleep study ordered at this time for follow-up

## 2022-08-16 ENCOUNTER — TELEPHONE (OUTPATIENT)
Dept: GASTROENTEROLOGY | Facility: CLINIC | Age: 57
End: 2022-08-16

## 2022-09-08 ENCOUNTER — TELEPHONE (OUTPATIENT)
Dept: OTHER | Facility: OTHER | Age: 57
End: 2022-09-08

## 2022-09-08 NOTE — TELEPHONE ENCOUNTER
Pts wife called and she said she provided the office with the insurance information and they called her for it  She's not sure why  Pt is requesting a call back

## 2022-09-09 ENCOUNTER — TELEPHONE (OUTPATIENT)
Dept: OTHER | Facility: OTHER | Age: 57
End: 2022-09-09

## 2022-09-09 NOTE — TELEPHONE ENCOUNTER
Patient's spouse called in to provide medical coverage  Triage RN tried to input insurance but system asks if I want to create duplicate coverage and rejects information for [employer]  / "[employer]  Unable to see any medical coverage   Please follow up with spouse

## 2022-09-12 NOTE — TELEPHONE ENCOUNTER
Sent Pranay Tobar an email in reference to this  I will wait for a response before I go further into this chart  I will update when I hear back from Pranay Tobar

## 2022-09-12 NOTE — TELEPHONE ENCOUNTER
Patient's wife called back stating she is upset that this is the third time she has given the insurance information  She also is upset that when a message is left it is for the scheduling number and this morning she waited an hour for a callback  She asks that if there are any further questions to please call the insurance first to verify or give a direct number she can call back     Emmanuel Abrahamchana is subscriber  [facility] [place] (under [employer] of [facility])  [facility] [phone]  [facility] [id number]  [phone]

## 2022-09-12 NOTE — TELEPHONE ENCOUNTER
Called and lmom asking pt to call back to update his insurance info  If he calls back, kvng update the info  The [facility] that was in the chart previously is coming back refected  Please get the update info and make sure that is correct and currently active  Thank you

## 2022-09-13 ENCOUNTER — OFFICE VISIT (OUTPATIENT)
Dept: GASTROENTEROLOGY | Facility: CLINIC | Age: 57
End: 2022-09-13
Payer: COMMERCIAL

## 2022-09-13 VITALS
SYSTOLIC BLOOD PRESSURE: 135 MMHG | DIASTOLIC BLOOD PRESSURE: 84 MMHG | HEIGHT: 68 IN | BODY MASS INDEX: 43.35 KG/M2 | WEIGHT: 286 LBS | HEART RATE: 84 BPM

## 2022-09-13 DIAGNOSIS — Z80.0 FAMILY HISTORY OF COLON CANCER IN FATHER: ICD-10-CM

## 2022-09-13 DIAGNOSIS — Z86.010 PERSONAL HISTORY OF COLONIC POLYPS: Primary | ICD-10-CM

## 2022-09-13 PROCEDURE — 99204 OFFICE O/P NEW MOD 45 MIN: CPT | Performed by: INTERNAL MEDICINE

## 2022-09-13 RX ORDER — DIPHENOXYLATE HYDROCHLORIDE AND ATROPINE SULFATE 2.5; .025 MG/1; MG/1
1 TABLET ORAL DAILY
COMMUNITY

## 2022-09-13 RX ORDER — POLYETHYLENE GLYCOL 3350, SODIUM SULFATE ANHYDROUS, SODIUM BICARBONATE, SODIUM CHLORIDE, POTASSIUM CHLORIDE 236; 22.74; 6.74; 5.86; 2.97 G/4L; G/4L; G/4L; G/4L; G/4L
4 POWDER, FOR SOLUTION ORAL ONCE
Qty: 4000 ML | Refills: 0 | Status: SHIPPED | OUTPATIENT
Start: 2022-09-13 | End: 2022-10-11

## 2022-09-13 NOTE — PROGRESS NOTES
[facility] [address] [facility] - Outpatient Consultation  Yanira Mendenhall 56 y o  male MRN: [id number]  Encounter: [id number]          ASSESSMENT AND PLAN:      1  Personal history of colonic polyps  -     Colonoscopy; Future; Expected date: 09/13/2022  -     polyethylene glycol (Golytely) 4000 mL solution; Take 4,000 mL by mouth once for 1 dose Take according to instructions given by the office for colonoscopy bowel prep  2  Family history of colon cancer in father  -     Colonoscopy; Future; Expected date: 09/13/2022    3  BMI 40 0-44 9, adult [facility])    Personal history of colon polyp family history of colon cancer in father, last colonoscopy more than 5 years ago, due for a follow-up colonoscopy for surveillance and high-risk screening  Colonoscopy procedure and prep discussed at length, had to use some abdominal pressure because of long tortuous colon next time, will try adult colonoscope this time  Procedure prep discussed, june in next few weeks  Advised patient to cut back on his caloric intake and lose some weight     ______________________________________________________________________    HPI:     Patient came in for evaluation of his history of colon polyp, father had colon cancer, last colonoscopy more than 5 years ago  He denies any blood in stools melena hematochezia, denies any abdominal pain nausea vomiting has some constipation, take fiber pills  Denies any dysphagia coughing choking spells excessive heartburn reflux indigestion chest pain shortness of breath fever chills rash  No history of CVA Ca CAD stents pacemakers  Has gained quite a bit of weight  Trying to watch his diet and weight, lost 20 lb  Diet medications more than 10 pertinent systems were reviewed  Some of the prior records noted      REVIEW OF SYSTEMS:    CONSTITUTIONAL: Denies any fever, chills, rigors, and weight loss  HEENT: No earache or tinnitus  CARDIOVASCULAR: No chest pain or palpitations  RESPIRATORY: Denies any cough, hemoptysis, shortness of breath or dyspnea on exertion  GASTROINTESTINAL: As noted in the History of Present Illness  GENITOURINARY: Denies any hematuria or dysuria  NEUROLOGIC: No dizziness or vertigo  MUSCULOSKELETAL: Denies any joint swellings  SKIN: Denies skin rashes or itching  ENDOCRINE: Denies excessive thirst  Denies intolerance to heat or cold  PSYCHOSOCIAL: Denies depression or anxiety  Denies any recent memory loss  Historical Information   Past Medical History:   Diagnosis Date    Colon polyp     High blood pressure     Hypertension      Past Surgical History:   Procedure Laterality Date    COLONOSCOPY      HERNIA REPAIR      WISDOM TOOTH EXTRACTION       Social History   Social History     Substance and Sexual Activity   Alcohol Use Yes    Comment: socially     Social History     Substance and Sexual Activity   Drug Use Never     Social History     Tobacco Use   Smoking Status Never Smoker   Smokeless Tobacco Never Used     Family History   Problem Relation Age of Onset    Diabetes Mother     Alzheimer's disease Mother    Pawel Pert Dementia Mother     Diabetes Father     Hypertension Father     Colon cancer Paternal Uncle     Heart disease Other     Hypertension Other     Diabetes Other     Cancer Other     Dementia Other        Meds/Allergies       Current Outpatient Medications:     cetirizine (ZyrTEC) 10 mg tablet    chlorhexidine (PERIDEX) 0 12 % solution    fluticasone (FLONASE) 50 mcg/act nasal spray    lisinopril (ZESTRIL) 20 mg tablet    multivitamin (THERAGRAN) TABS    polyethylene glycol (Golytely) 4000 mL solution    Cholecalciferol (Vitamin D3) 125 MCG (5000 UT) CHEW    No Known Allergies        Objective     Blood pressure 135/84, pulse 84, height 5' 8" (1 727 m), weight 130 kg (286 lb)  Body mass index is 43 49 kg/m²          PHYSICAL EXAM:      General Appearance:   Alert, cooperative, no distress   HEENT:   Normocephalic, atraumatic, anicteric  Neck:  Supple, symmetrical, trachea midline   Lungs:   Clear to auscultation bilaterally; no rales, rhonchi or wheezing; respirations unlabored    Heart[de-identified]   Regular rate and rhythm; no murmur  Abdomen:   Soft, non-tender, non-distended; normal bowel sounds; no masses, no organomegaly    Genitalia:   Deferred    Rectal:   Deferred    Extremities:  No cyanosis, clubbing or edema    Skin:  No jaundice, rashes, or lesions    Lymph nodes:  No palpable cervical lymphadenopathy        Lab Results:   No visits with results within 1 Day(s) from this visit     Latest known visit with results is:   Orders Only on 02/04/2021   Component Date Value    Total Cholesterol 02/04/2021 153     HDL 02/04/2021 40     Triglycerides 02/04/2021 56     LDL Calculated 02/04/2021 99     Chol HDLC Ratio 02/04/2021 3 8     Non-HDL Cholesterol 02/04/2021 113     HIV AG/AB, 4th Gen 02/04/2021 NON-REACTIVE     Glucose, Random 02/04/2021 92     BUN 02/04/2021 19     Creatinine 02/04/2021 0 91     eGFR Non African American 02/04/2021 95     eGFR  02/04/2021 110     SL AMB BUN/CREATININE RA* 54/48/6989 NOT APPLICABLE     Sodium 77/71/4590 141     Potassium 02/04/2021 3 7     Chloride 02/04/2021 107     CO2 02/04/2021 26     Calcium 02/04/2021 8 7     Protein, Total 02/04/2021 6 9     Albumin 02/04/2021 3 8     Globulin 02/04/2021 3 1     Albumin/Globulin Ratio 02/04/2021 1 2     TOTAL BILIRUBIN 02/04/2021 0 8     Alkaline Phosphatase 02/04/2021 48     AST 02/04/2021 31     ALT 02/04/2021 27     Color UA 02/04/2021 YELLOW     Urine Appearance 02/04/2021 CLEAR     Specific Gravity 02/04/2021 1 027     Ph 02/04/2021 5 5     Glucose, Urine 02/04/2021 NEGATIVE     Bilirubin, Urine 02/04/2021 NEGATIVE     Ketone, Urine 02/04/2021 NEGATIVE     Blood, Urine 02/04/2021 NEGATIVE     Protein, Urine 02/04/2021 NEGATIVE     SL AMB NITRITES URINE, Q* 02/04/2021 NEGATIVE     Leukocyte Esterase 02/04/2021 NEGATIVE     SL AMB WBC, URINE 02/04/2021 NONE SEEN     RBC, Urine 02/04/2021 NONE SEEN     Squamous Epithelial Cells 02/04/2021 NONE SEEN     Bacteria, UA 02/04/2021 NONE SEEN     Hyaline Casts 02/04/2021 NONE SEEN     Urine Culture, Comprehen* 02/04/2021      White Blood Cell Count 02/04/2021 6 6     Red Blood Cell Count 02/04/2021 4 84     Hemoglobin 02/04/2021 13 7     HCT 02/04/2021 41 0     MCV 02/04/2021 84 7     MCH 02/04/2021 28 3     MCHC 02/04/2021 33 4     RDW 02/04/2021 13 9     Platelet Count 84/31/9131 251     SL AMB MPV 02/04/2021 11 2     Neutrophils (Absolute) 02/04/2021 2,779     Lymphocytes (Absolute) 02/04/2021 2,845     Monocytes (Absolute) 02/04/2021 772     Eosinophils (Absolute) 02/04/2021 191     Basophils ABS 02/04/2021 13     Neutrophils 02/04/2021 42 1     Lymphocytes 02/04/2021 43 1     Monocytes 02/04/2021 11 7     Eosinophils 02/04/2021 2 9     Basophils PCT 02/04/2021 0 2     Vitamin D, 25-Hydroxy, S* 02/04/2021 32          Radiology Results:   No results found

## 2022-09-13 NOTE — TELEPHONE ENCOUNTER
Pt has an appt this morning  He will need to bring card with him  They will have to try at the check in window to resolve the issue  Rosina Corbin and Mariana Stock from other office were trying to figure it out yesterday  Rosina Corbin talked with pt

## 2022-09-13 NOTE — H&P (VIEW-ONLY)
[facility] [address] [facility] - Outpatient Consultation  Ki Canela 56 y o  male MRN: [id number]  Encounter: [id number]          ASSESSMENT AND PLAN:      1  Personal history of colonic polyps  -     Colonoscopy; Future; Expected date: 09/13/2022  -     polyethylene glycol (Golytely) 4000 mL solution; Take 4,000 mL by mouth once for 1 dose Take according to instructions given by the office for colonoscopy bowel prep  2  Family history of colon cancer in father  -     Colonoscopy; Future; Expected date: 09/13/2022    3  BMI 40 0-44 9, adult [facility])    Personal history of colon polyp family history of colon cancer in father, last colonoscopy more than 5 years ago, due for a follow-up colonoscopy for surveillance and high-risk screening  Colonoscopy procedure and prep discussed at length, had to use some abdominal pressure because of long tortuous colon next time, will try adult colonoscope this time  Procedure prep discussed, june in next few weeks  Advised patient to cut back on his caloric intake and lose some weight     ______________________________________________________________________    HPI:     Patient came in for evaluation of his history of colon polyp, father had colon cancer, last colonoscopy more than 5 years ago  He denies any blood in stools melena hematochezia, denies any abdominal pain nausea vomiting has some constipation, take fiber pills  Denies any dysphagia coughing choking spells excessive heartburn reflux indigestion chest pain shortness of breath fever chills rash  No history of CVA Ca CAD stents pacemakers  Has gained quite a bit of weight  Trying to watch his diet and weight, lost 20 lb  Diet medications more than 10 pertinent systems were reviewed  Some of the prior records noted      REVIEW OF SYSTEMS:    CONSTITUTIONAL: Denies any fever, chills, rigors, and weight loss  HEENT: No earache or tinnitus  CARDIOVASCULAR: No chest pain or palpitations  RESPIRATORY: Denies any cough, hemoptysis, shortness of breath or dyspnea on exertion  GASTROINTESTINAL: As noted in the History of Present Illness  GENITOURINARY: Denies any hematuria or dysuria  NEUROLOGIC: No dizziness or vertigo  MUSCULOSKELETAL: Denies any joint swellings  SKIN: Denies skin rashes or itching  ENDOCRINE: Denies excessive thirst  Denies intolerance to heat or cold  PSYCHOSOCIAL: Denies depression or anxiety  Denies any recent memory loss  Historical Information   Past Medical History:   Diagnosis Date   • Colon polyp    • High blood pressure    • Hypertension      Past Surgical History:   Procedure Laterality Date   • COLONOSCOPY     • HERNIA REPAIR     • WISDOM TOOTH EXTRACTION       Social History   Social History     Substance and Sexual Activity   Alcohol Use Yes    Comment: socially     Social History     Substance and Sexual Activity   Drug Use Never     Social History     Tobacco Use   Smoking Status Never Smoker   Smokeless Tobacco Never Used     Family History   Problem Relation Age of Onset   • Diabetes Mother    • Alzheimer's disease Mother    • Dementia Mother    • Diabetes Father    • Hypertension Father    • Colon cancer Paternal Uncle    • Heart disease Other    • Hypertension Other    • Diabetes Other    • Cancer Other    • Dementia Other        Meds/Allergies       Current Outpatient Medications:   •  cetirizine (ZyrTEC) 10 mg tablet  •  chlorhexidine (PERIDEX) 0 12 % solution  •  fluticasone (FLONASE) 50 mcg/act nasal spray  •  lisinopril (ZESTRIL) 20 mg tablet  •  multivitamin (THERAGRAN) TABS  •  polyethylene glycol (Golytely) 4000 mL solution  •  Cholecalciferol (Vitamin D3) 125 MCG (5000 UT) CHEW    No Known Allergies        Objective     Blood pressure 135/84, pulse 84, height 5' 8" (1 727 m), weight 130 kg (286 lb)  Body mass index is 43 49 kg/m²          PHYSICAL EXAM:      General Appearance:   Alert, cooperative, no distress   HEENT:   Normocephalic, atraumatic, anicteric  Neck:  Supple, symmetrical, trachea midline   Lungs:   Clear to auscultation bilaterally; no rales, rhonchi or wheezing; respirations unlabored    Heart[de-identified]   Regular rate and rhythm; no murmur  Abdomen:   Soft, non-tender, non-distended; normal bowel sounds; no masses, no organomegaly    Genitalia:   Deferred    Rectal:   Deferred    Extremities:  No cyanosis, clubbing or edema    Skin:  No jaundice, rashes, or lesions    Lymph nodes:  No palpable cervical lymphadenopathy        Lab Results:   No visits with results within 1 Day(s) from this visit     Latest known visit with results is:   Orders Only on 02/04/2021   Component Date Value   • Total Cholesterol 02/04/2021 153    • HDL 02/04/2021 40    • Triglycerides 02/04/2021 56    • LDL Calculated 02/04/2021 99    • Chol HDLC Ratio 02/04/2021 3 8    • Non-HDL Cholesterol 02/04/2021 113    • HIV AG/AB, 4th Gen 02/04/2021 NON-REACTIVE    • Glucose, Random 02/04/2021 92    • BUN 02/04/2021 19    • Creatinine 02/04/2021 0 91    • eGFR Non African American 02/04/2021 95    • eGFR  02/04/2021 110    • SL AMB BUN/CREATININE RA* 83/56/3879 NOT APPLICABLE    • Sodium 47/66/0303 141    • Potassium 02/04/2021 3 7    • Chloride 02/04/2021 107    • CO2 02/04/2021 26    • Calcium 02/04/2021 8 7    • Protein, Total 02/04/2021 6 9    • Albumin 02/04/2021 3 8    • Globulin 02/04/2021 3 1    • Albumin/Globulin Ratio 02/04/2021 1 2    • TOTAL BILIRUBIN 02/04/2021 0 8    • Alkaline Phosphatase 02/04/2021 48    • AST 02/04/2021 31    • ALT 02/04/2021 27    • Color UA 02/04/2021 YELLOW    • Urine Appearance 02/04/2021 CLEAR    • Specific Gravity 02/04/2021 1 027    • Ph 02/04/2021 5 5    • Glucose, Urine 02/04/2021 NEGATIVE    • Bilirubin, Urine 02/04/2021 NEGATIVE    • Ketone, Urine 02/04/2021 NEGATIVE    • Blood, Urine 02/04/2021 NEGATIVE    • Protein, Urine 02/04/2021 NEGATIVE    • SL AMB NITRITES URINE, Q* 02/04/2021 NEGATIVE    • Leukocyte Esterase 02/04/2021 NEGATIVE    • SL AMB WBC, URINE 02/04/2021 NONE SEEN    • RBC, Urine 02/04/2021 NONE SEEN    • Squamous Epithelial Cells 02/04/2021 NONE SEEN    • Bacteria, UA 02/04/2021 NONE SEEN    • Hyaline Casts 02/04/2021 NONE SEEN    • Urine Culture, Comprehen* 02/04/2021     • White Blood Cell Count 02/04/2021 6 6    • Red Blood Cell Count 02/04/2021 4 84    • Hemoglobin 02/04/2021 13 7    • HCT 02/04/2021 41 0    • MCV 02/04/2021 84 7    • MCH 02/04/2021 28 3    • MCHC 02/04/2021 33 4    • RDW 02/04/2021 13 9    • Platelet Count 79/91/5407 251    • SL AMB MPV 02/04/2021 11 2    • Neutrophils (Absolute) 02/04/2021 2,779    • Lymphocytes (Absolute) 02/04/2021 2,845    • Monocytes (Absolute) 02/04/2021 772    • Eosinophils (Absolute) 02/04/2021 191    • Basophils ABS 02/04/2021 13    • Neutrophils 02/04/2021 42 1    • Lymphocytes 02/04/2021 43 1    • Monocytes 02/04/2021 11 7    • Eosinophils 02/04/2021 2 9    • Basophils PCT 02/04/2021 0 2    • Vitamin D, 25-Hydroxy, S* 02/04/2021 32          Radiology Results:   No results found

## 2022-09-13 NOTE — PATIENT INSTRUCTIONS
Scheduled date of colonoscopy (as of today): 10/11/22  Physician performing colonoscopy: Dr Deepthi Erickson  Location of colonoscopy: [facility] [place]  Bowel prep reviewed with patient: Golytely   Instructions reviewed with patient by: Kaitlin   Clearances:  N/A    Adult Colonopscope

## 2022-10-11 ENCOUNTER — HOSPITAL ENCOUNTER (OUTPATIENT)
Dept: GASTROENTEROLOGY | Facility: AMBULARY SURGERY CENTER | Age: 57
Setting detail: OUTPATIENT SURGERY
Discharge: HOME | End: 2022-10-11
Attending: INTERNAL MEDICINE
Payer: COMMERCIAL

## 2022-10-11 ENCOUNTER — ANESTHESIA (OUTPATIENT)
Dept: GASTROENTEROLOGY | Facility: AMBULARY SURGERY CENTER | Age: 57
End: 2022-10-11

## 2022-10-11 ENCOUNTER — ANESTHESIA EVENT (OUTPATIENT)
Dept: GASTROENTEROLOGY | Facility: AMBULARY SURGERY CENTER | Age: 57
End: 2022-10-11

## 2022-10-11 VITALS
BODY MASS INDEX: 43.35 KG/M2 | RESPIRATION RATE: 18 BRPM | TEMPERATURE: 97 F | WEIGHT: 286 LBS | DIASTOLIC BLOOD PRESSURE: 71 MMHG | SYSTOLIC BLOOD PRESSURE: 107 MMHG | OXYGEN SATURATION: 96 % | HEART RATE: 62 BPM | HEIGHT: 68 IN

## 2022-10-11 DIAGNOSIS — Z80.0 FAMILY HISTORY OF COLON CANCER IN FATHER: ICD-10-CM

## 2022-10-11 DIAGNOSIS — Z86.010 PERSONAL HISTORY OF COLONIC POLYPS: ICD-10-CM

## 2022-10-11 PROBLEM — Z11.59 NEED FOR HEPATITIS C SCREENING TEST: Status: RESOLVED | Noted: 2022-07-15 | Resolved: 2022-10-11

## 2022-10-11 PROBLEM — Z00.8 EXAM FOR POPULATION SURVEY: Status: RESOLVED | Noted: 2022-07-15 | Resolved: 2022-10-11

## 2022-10-11 PROBLEM — Z13.220 SCREENING, LIPID: Status: RESOLVED | Noted: 2020-12-09 | Resolved: 2022-10-11

## 2022-10-11 PROCEDURE — 45380 COLONOSCOPY AND BIOPSY: CPT | Performed by: INTERNAL MEDICINE

## 2022-10-11 PROCEDURE — 88305 TISSUE EXAM BY PATHOLOGIST: CPT | Performed by: PATHOLOGY

## 2022-10-11 PROCEDURE — 45385 COLONOSCOPY W/LESION REMOVAL: CPT | Performed by: INTERNAL MEDICINE

## 2022-10-11 RX ORDER — SODIUM CHLORIDE, SODIUM LACTATE, POTASSIUM CHLORIDE, CALCIUM CHLORIDE 600; 310; 30; 20 MG/100ML; MG/100ML; MG/100ML; MG/100ML
100 INJECTION, SOLUTION INTRAVENOUS CONTINUOUS
Status: DISCONTINUED | OUTPATIENT
Start: 2022-10-11 | End: 2022-10-15 | Stop reason: HOSPADM

## 2022-10-11 RX ORDER — SODIUM CHLORIDE, SODIUM LACTATE, POTASSIUM CHLORIDE, CALCIUM CHLORIDE 600; 310; 30; 20 MG/100ML; MG/100ML; MG/100ML; MG/100ML
INJECTION, SOLUTION INTRAVENOUS CONTINUOUS PRN
Status: DISCONTINUED | OUTPATIENT
Start: 2022-10-11 | End: 2022-10-11

## 2022-10-11 RX ORDER — LIDOCAINE HYDROCHLORIDE 10 MG/ML
INJECTION, SOLUTION EPIDURAL; INFILTRATION; INTRACAUDAL; PERINEURAL AS NEEDED
Status: DISCONTINUED | OUTPATIENT
Start: 2022-10-11 | End: 2022-10-11

## 2022-10-11 RX ORDER — PROPOFOL 10 MG/ML
INJECTION, EMULSION INTRAVENOUS CONTINUOUS PRN
Status: DISCONTINUED | OUTPATIENT
Start: 2022-10-11 | End: 2022-10-11

## 2022-10-11 RX ORDER — PROPOFOL 10 MG/ML
INJECTION, EMULSION INTRAVENOUS AS NEEDED
Status: DISCONTINUED | OUTPATIENT
Start: 2022-10-11 | End: 2022-10-11

## 2022-10-11 RX ADMIN — SODIUM CHLORIDE, SODIUM LACTATE, POTASSIUM CHLORIDE, AND CALCIUM CHLORIDE: .6; .31; .03; .02 INJECTION, SOLUTION INTRAVENOUS at 08:35

## 2022-10-11 RX ADMIN — PROPOFOL 50 MG: 10 INJECTION, EMULSION INTRAVENOUS at 08:38

## 2022-10-11 RX ADMIN — SODIUM CHLORIDE, SODIUM LACTATE, POTASSIUM CHLORIDE, AND CALCIUM CHLORIDE 100 ML/HR: .6; .31; .03; .02 INJECTION, SOLUTION INTRAVENOUS at 08:10

## 2022-10-11 RX ADMIN — LIDOCAINE HYDROCHLORIDE 50 MG: 10 INJECTION, SOLUTION EPIDURAL; INFILTRATION; INTRACAUDAL; PERINEURAL at 08:38

## 2022-10-11 RX ADMIN — PROPOFOL 150 MCG/KG/MIN: 10 INJECTION, EMULSION INTRAVENOUS at 08:38

## 2022-10-11 NOTE — ANESTHESIA PREPROCEDURE EVALUATION
Procedure:  COLONOSCOPY    Relevant Problems   CARDIO   (+) Essential hypertension      PULMONARY   (+) LEAH (obstructive sleep apnea)        Physical Exam    Airway    Mallampati score: II  TM Distance: >3 FB  Neck ROM: full     Dental   No notable dental hx     Cardiovascular  Cardiovascular exam normal    Pulmonary  Pulmonary exam normal     Other Findings        Anesthesia Plan  ASA Score- 2     Anesthesia Type- IV sedation with anesthesia with ASA Monitors  Additional Monitors:   Airway Plan:           Plan Factors-Exercise tolerance (METS): >4 METS  Chart reviewed  Imaging results reviewed  Existing labs reviewed  Patient summary reviewed  Patient is not a current smoker  Obstructive sleep apnea risk education given perioperatively  Induction-     Postoperative Plan-     Informed Consent- Anesthetic plan and risks discussed with patient  I personally reviewed this patient with the CRNA  Discussed and agreed on the Anesthesia Plan with the TAMMY Giang

## 2022-10-11 NOTE — ANESTHESIA POSTPROCEDURE EVALUATION
Post-Op Assessment Note    CV Status:  Stable  Pain Score: 0    Pain management: adequate     Mental Status:  Alert and awake   Hydration Status:  Euvolemic   PONV Controlled:  Controlled   Airway Patency:  Patent      Post Op Vitals Reviewed: Yes      Staff: CRNA         No complications documented      BP   134/65   Temp     Pulse 74   Resp   16   SpO2   98

## 2022-10-11 NOTE — INTERVAL H&P NOTE
H&P reviewed  After examining the patient I find no changes in the patients condition since the H&P had been written      Vitals:    10/11/22 0807   BP: 133/79   Pulse: 67   Resp: 18   Temp: (!) 97 °F (36 1 °C)   SpO2: 97%

## 2022-10-20 PROCEDURE — 88305 TISSUE EXAM BY PATHOLOGIST: CPT | Performed by: PATHOLOGY

## 2023-03-04 ENCOUNTER — OFFICE VISIT (OUTPATIENT)
Dept: URGENT CARE | Facility: CLINIC | Age: 58
End: 2023-03-04

## 2023-03-04 VITALS
SYSTOLIC BLOOD PRESSURE: 142 MMHG | BODY MASS INDEX: 42.69 KG/M2 | OXYGEN SATURATION: 98 % | TEMPERATURE: 103.1 F | HEART RATE: 106 BPM | WEIGHT: 272 LBS | DIASTOLIC BLOOD PRESSURE: 73 MMHG | RESPIRATION RATE: 18 BRPM | HEIGHT: 67 IN

## 2023-03-04 DIAGNOSIS — U07.1 COVID-19: Primary | ICD-10-CM

## 2023-03-04 LAB
SARS-COV-2 AG UPPER RESP QL IA: POSITIVE
VALID CONTROL: ABNORMAL

## 2023-03-04 RX ORDER — BENZONATATE 200 MG/1
200 CAPSULE ORAL 3 TIMES DAILY PRN
Qty: 20 CAPSULE | Refills: 0 | Status: SHIPPED | OUTPATIENT
Start: 2023-03-04

## 2023-03-04 NOTE — LETTER
March 4, 2023     Patient: Elaine Dobson   YOB: 1965   Date of Visit: 3/4/2023       To Whom it May Concern:    Elaine Dobson was seen in my clinic on 3/4/2023  He tested positive for COVID-19  He has had symptoms since last night  If you have any questions or concerns, please don't hesitate to call           Sincerely,          Robson Farias PA-C        CC: No Recipients

## 2023-03-05 NOTE — PROGRESS NOTES
[facility]        NAME: Abida Olsen is a 66 y o  male  : 1965    MRN: [id number]  DATE: 2023  TIME: 8:11 PM      Assessment and Plan     COVID-19 [U07 1]  1  COVID-19  Poct Covid 19 Rapid Antigen Test    benzonatate (TESSALON) 200 MG capsule            Patient Instructions     Patient Instructions   1  Please take plain Robitussin or plain Mucinex for mucus relief as directed on the packaging  2  Please take either Sudafed (for those without history of high blood pressure) or Coricidin HBP (for those with history of high blood pressure) for nasal/sinus congestion  3  Please take over the counter ibuprofen or acetaminophen as needed for pain/sore throat  4  Please follow up with primary care provider in 7-10 days if symptoms persist          Follow up with PCP in 3-5 days  Go to ER if symptoms worsen  Chief Complaint     Chief Complaint   Patient presents with   • Cold Like Symptoms     Pt  C/o body aches, fever, cough, and fatigue x last night  Pt  Took theriflu and tylenol  History of Present Illness     Patient presents with cough, sore throat, nasal congestion, fever, chills, and body aches since last night  He took Theraflu and tylenol with little relief  Review of Systems     Review of Systems   Constitutional: Positive for chills, fatigue and fever  HENT: Positive for congestion and sore throat  Negative for ear pain, postnasal drip, rhinorrhea, sinus pressure and sinus pain  Eyes: Negative for pain and visual disturbance  Respiratory: Positive for cough  Negative for shortness of breath  Cardiovascular: Negative for chest pain and palpitations  Gastrointestinal: Negative for abdominal pain, diarrhea, nausea and vomiting  Genitourinary: Negative for dysuria and hematuria  Musculoskeletal: Positive for myalgias  Negative for arthralgias and back pain  Skin: Negative for color change and rash     Neurological: Negative for dizziness, seizures, syncope, numbness and headaches  All other systems reviewed and are negative  Current Medications       Current Outpatient Medications:   •  benzonatate (TESSALON) 200 MG capsule, Take 1 capsule (200 mg total) by mouth 3 (three) times a day as needed for cough, Disp: 20 capsule, Rfl: 0  •  cetirizine (ZyrTEC) 10 mg tablet, TAKE 1 TABLET BY MOUTH ONCE DAILY AT BEDTIME, Disp: 30 tablet, Rfl: 0  •  fluticasone (FLONASE) 50 mcg/act nasal spray, 1 spray into each nostril daily (Patient taking differently: 1 spray into each nostril daily at bedtime), Disp: 16 g, Rfl: 2  •  lisinopril (ZESTRIL) 20 mg tablet, Take 1 tablet (20 mg total) by mouth daily (Patient taking differently: Take 20 mg by mouth every morning), Disp: 90 tablet, Rfl: 3  •  multivitamin (THERAGRAN) TABS, Take 1 tablet by mouth daily, Disp: , Rfl:   •  chlorhexidine (PERIDEX) 0 12 % solution, RINSE 30 SECONDS TWICE DAILY AFTER BRUSHING, Disp: , Rfl:     Current Allergies     Allergies as of 03/04/2023   • (No Known Allergies)              The following portions of the patient's history were reviewed and updated as appropriate: allergies, current medications, past family history, past medical history, past social history, past surgical history, and problem list      Past Medical History:   Diagnosis Date   • Colon polyp    • High blood pressure    • Hypertension        Past Surgical History:   Procedure Laterality Date   • COLONOSCOPY     • HERNIA REPAIR     • WISDOM TOOTH EXTRACTION         Family History   Problem Relation Age of Onset   • Diabetes Mother    • Alzheimer's disease Mother    • Dementia Mother    • Diabetes Father    • Hypertension Father    • Colon cancer Paternal Uncle    • Heart disease Other    • Hypertension Other    • Diabetes Other    • Cancer Other    • Dementia Other          Medications have been verified          Objective     /73   Pulse (!) 106   Temp (!) 103 1 °F (39 5 °C)   Resp 18   Ht 5' 7" (1 702 m)   Wt 123 kg (272 lb)   SpO2 98%   BMI 42 60 kg/m²   No LMP for male patient  Physical Exam     Physical Exam  Vitals and nursing note reviewed  Constitutional:       Appearance: He is obese  He is ill-appearing  HENT:      Head: Normocephalic and atraumatic  Right Ear: Tympanic membrane, ear canal and external ear normal       Left Ear: Tympanic membrane, ear canal and external ear normal       Nose: Congestion present  Mouth/Throat:      Mouth: Mucous membranes are moist       Pharynx: Posterior oropharyngeal erythema present  Cardiovascular:      Rate and Rhythm: Normal rate and regular rhythm  Pulses: Normal pulses  Heart sounds: Normal heart sounds  Pulmonary:      Effort: Pulmonary effort is normal       Breath sounds: Normal breath sounds  Skin:     General: Skin is warm and dry  Neurological:      General: No focal deficit present  Mental Status: He is alert and oriented to person, place, and time     Psychiatric:         Mood and Affect: Mood normal          Behavior: Behavior normal

## 2024-03-25 ENCOUNTER — OFFICE VISIT (OUTPATIENT)
Dept: FAMILY MEDICINE CLINIC | Facility: CLINIC | Age: 59
End: 2024-03-25
Payer: COMMERCIAL

## 2024-03-25 VITALS
WEIGHT: 290.2 LBS | DIASTOLIC BLOOD PRESSURE: 88 MMHG | OXYGEN SATURATION: 95 % | HEIGHT: 67 IN | BODY MASS INDEX: 45.55 KG/M2 | TEMPERATURE: 99 F | HEART RATE: 76 BPM | SYSTOLIC BLOOD PRESSURE: 126 MMHG

## 2024-03-25 DIAGNOSIS — J34.2 DEVIATED NASAL SEPTUM: ICD-10-CM

## 2024-03-25 DIAGNOSIS — R73.9 ELEVATED BLOOD SUGAR: ICD-10-CM

## 2024-03-25 DIAGNOSIS — E55.9 VITAMIN D INSUFFICIENCY: ICD-10-CM

## 2024-03-25 DIAGNOSIS — J30.89 NON-SEASONAL ALLERGIC RHINITIS DUE TO OTHER ALLERGIC TRIGGER: ICD-10-CM

## 2024-03-25 DIAGNOSIS — G47.33 OSA (OBSTRUCTIVE SLEEP APNEA): ICD-10-CM

## 2024-03-25 DIAGNOSIS — R53.83 FATIGUE, UNSPECIFIED TYPE: ICD-10-CM

## 2024-03-25 DIAGNOSIS — Z12.5 SCREENING FOR PROSTATE CANCER: ICD-10-CM

## 2024-03-25 DIAGNOSIS — R63.8 INCREASED BMI: ICD-10-CM

## 2024-03-25 DIAGNOSIS — Z79.899 MEDICATION MANAGEMENT: ICD-10-CM

## 2024-03-25 DIAGNOSIS — I10 ESSENTIAL HYPERTENSION: Primary | ICD-10-CM

## 2024-03-25 PROCEDURE — 99215 OFFICE O/P EST HI 40 MIN: CPT | Performed by: FAMILY MEDICINE

## 2024-03-25 RX ORDER — VITAMIN B COMPLEX
1 CAPSULE ORAL DAILY
COMMUNITY

## 2024-03-25 RX ORDER — LISINOPRIL 20 MG/1
20 TABLET ORAL DAILY
Qty: 90 TABLET | Refills: 3 | Status: SHIPPED | OUTPATIENT
Start: 2024-03-25

## 2024-03-25 NOTE — PROGRESS NOTES
Assessment/Plan:          1. Essential hypertension  -     CBC; Future  -     UA w Reflex to Microscopic w Reflex to Culture  -     Comprehensive metabolic panel; Future  -     Lipid panel; Future  -     TSH, 3rd generation; Future  -     Vitamin D 25 hydroxy; Future  -     Hemoglobin A1C; Future  -     PSA, Total Screen; Future  -     Albumin / creatinine urine ratio  -     lisinopril (ZESTRIL) 20 mg tablet; Take 1 tablet (20 mg total) by mouth daily    2. Deviated nasal septum  Assessment & Plan:  He needs rhinoplasty. He cannot use CPAP due to the deviated septum. I will refer him to Dr. Audie Chavez, ENT.    Orders:  -     Ambulatory Referral to Otolaryngology; Future; Expected date: 04/08/2024    3. LEAH (obstructive sleep apnea)  Comments:  Tested yrs ago - nothing worked.   Needs deviated spetum fixed.  Assessment & Plan:  We will order a repeat test for sleep apnea. He will call his insurance to find out whether it is covered or not.    Orders:  -     CBC; Future  -     UA w Reflex to Microscopic w Reflex to Culture  -     Comprehensive metabolic panel; Future  -     Lipid panel; Future  -     TSH, 3rd generation; Future  -     Vitamin D 25 hydroxy; Future  -     Hemoglobin A1C; Future  -     PSA, Total Screen; Future  -     Albumin / creatinine urine ratio    4. Non-seasonal allergic rhinitis due to other allergic trigger    5. Vitamin D insufficiency  -     Vitamin D 25 hydroxy; Future    6. Fatigue, unspecified type  -     CBC; Future  -     UA w Reflex to Microscopic w Reflex to Culture  -     Comprehensive metabolic panel; Future  -     Lipid panel; Future  -     TSH, 3rd generation; Future  -     Vitamin D 25 hydroxy; Future  -     Hemoglobin A1C; Future  -     PSA, Total Screen; Future  -     Albumin / creatinine urine ratio    7. Elevated blood sugar  -     Hemoglobin A1C; Future    8. Screening for prostate cancer  -     PSA, Total Screen; Future    9. Increased BMI  -     CBC; Future  -     UA w  Reflex to Microscopic w Reflex to Culture  -     Comprehensive metabolic panel; Future  -     Lipid panel; Future  -     TSH, 3rd generation; Future  -     Vitamin D 25 hydroxy; Future  -     Hemoglobin A1C; Future  -     PSA, Total Screen; Future  -     Albumin / creatinine urine ratio    10. Medication management  Assessment & Plan:  He will continue over-the-counter cetirizine.    Orders:  -     CBC; Future  -     UA w Reflex to Microscopic w Reflex to Culture  -     Comprehensive metabolic panel; Future  -     Lipid panel; Future  -     TSH, 3rd generation; Future  -     Vitamin D 25 hydroxy; Future  -     Hemoglobin A1C; Future  -     PSA, Total Screen; Future  -     Albumin / creatinine urine ratio        Weight gain.  His BMI is 45 kg/m^2. We discussed weight loss medications, diet, exercise, and medications. I will obtain blood work. We discussed Mounjaro. I will obtain blood work including hemoglobin A1c and PSA.    Nocturia.  He was advised to drink most of his fluids during the day and not much after supper. He was advised to avoid prostate and bladder irritants.     Health maintenance.  His vitamin D is low. I will place an order to check his vitamin D level.     Follow-up  The patient will follow up with me in 1 month for Medicare wellness.                    Subjective:       Patient ID: Cassius Beltran is a 59 y.o. male who presents to the office to establish care.    He is obese with a history of weight fluctuation. He lost 20 pounds, only to regain 30 pounds. His weight was 175 pounds during his upbringing in [place]. His target weight is 200 pounds. He was previously active, weighing 280 pounds in 2020. He lost 30 pounds over a period of 5 months following forced shelter and subsequent stay with his father, [place]. His weight was 200 pounds during a period of intense exercise, which involved working out twice daily.     He has not undergone a prostate examination. He has been experiencing  nocturia more frequently over the past year. His fluid intake primarily consists of coffee, tea, and beer, with minimal soda consumption. He tends to stay awake to care for his dog and wakes up at 6:00 a.m. to assist his mother. A good night's sleep for him is approximately 6 hours. 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    He is not prediabetic. He felt healthiest at a weight of 200 pounds. 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 His diet occasionally includes diet soda and he primarily drinks water. His sugar intake is not specified. He has expressed no interest in weight loss aids. He reports increasing knee pain with age.     He is currently on a regimen of cetirizine, Mucinex spray, and Benadryl, all administered at night. He reports improved sleep quality with Mucinex. He has a diagnosed deviated septum and is interested in corrective surgery. A previous consultation for the procedure was unsuccessful as the medical team was uncomfortable performing it. He is seeking a referral to see and else, expressing concern about long-term reliance on allergy medications.     He has a history of sleep apnea and underwent a sleep study several years ago. The prescribed CPAP machine was ineffective. An internet-connected machine was tried with adjusted settings, but this worsened his condition. A recommendation was made to try 2 different allergy medications and sprays.    He vaped once in the summer 30 years ago. He drinks alcohol socially. He uses marijuana. 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 He is  and has 2 children.    His uncle had colon cancer.    He takes a vitamin D supplement. He takes lisinopril every day.    Laboratory Studies  Labs from 02/2021 were reviewed with the patient.  Blood glucose level: 92 mg/dL.       The following portions of the patient's history were reviewed and updated as appropriate: allergies, current  "medications, past family history, past medical history, past social history, past surgical history, and problem list.      Review of systems are normal.        Objective:       /88 (BP Location: Left arm, Patient Position: Sitting, Cuff Size: Standard)   Pulse 76   Temp 99 °F (37.2 °C) (Temporal)   Ht 5' 7\" (1.702 m)   Wt 132 kg (290 lb 3.2 oz)   SpO2 95%   BMI 45.45 kg/m²          Physical Exam  Vitals and nursing note reviewed.   Constitutional:       General: He is not in acute distress. He is morbidly obese.     Appearance: Normal appearance. He is well-developed.   HENT:      Head: Normocephalic and atraumatic.   Eyes:      General:         Right eye: No discharge.         Left eye: No discharge.   Neck:      Thyroid: No thyromegaly.   Cardiovascular:      Rate and Rhythm: Normal rate and regular rhythm.      Pulses: Normal pulses.      Heart sounds: Normal heart sounds. No murmur heard.  Pulmonary:      Effort: Pulmonary effort is normal.      Breath sounds: Normal breath sounds. No wheezing or rhonchi.   Musculoskeletal:      Cervical back: Neck supple.      Right lower leg: No edema.      Left lower leg: No edema.   Lymphadenopathy:      Cervical: No cervical adenopathy.   Skin:     General: Skin is warm.      Capillary Refill: Capillary refill takes less than 2 seconds.   Neurological:      General: No focal deficit present.      Mental Status: He is alert and oriented to person, place, and time.   Psychiatric:         Mood and Affect: Mood normal.         Behavior: Behavior normal.         Thought Content: Thought content normal.          I personally reviewed the recent (and prior)  lab results, the image studies, pathology, other specialty/physicians consult notes and recommendations, and outside medical records from other institutions, as appropriate. I had a lengthy discussion with the patient and shared the work-up findings. We discussed the diagnosis and management plan.  I spent  35  " minutes reviewing the records (labs, clinician notes, outside records, medical history, ordering medicine/tests/procedures, interpreting the imaging/labs previously done) and coordination of care as well as direct time with the patient today, of which greater than 50% of the time was spent in counseling and coordination of care with the patient/family.    Transcribed for DOLORES Ortiz DO, by Olman Montemayor on 03/31/24 at 9:50 PM. Powered by Dragon [employer].

## 2024-03-26 NOTE — ASSESSMENT & PLAN NOTE
He needs rhinoplasty. He cannot use CPAP due to the deviated septum. I will refer him to Dr. Audie Chavez, ENT.

## 2024-03-26 NOTE — ASSESSMENT & PLAN NOTE
We will order a repeat test for sleep apnea. He will call his insurance to find out whether it is covered or not.

## 2024-03-31 LAB
25(OH)D3 SERPL-MCNC: 46 NG/ML (ref 30–100)
ALBUMIN SERPL-MCNC: 3.8 G/DL (ref 3.6–5.1)
ALBUMIN/GLOB SERPL: 1.2 (CALC) (ref 1–2.5)
ALP SERPL-CCNC: 57 U/L (ref 35–144)
ALT SERPL-CCNC: 26 U/L (ref 9–46)
APPEARANCE UR: CLEAR
AST SERPL-CCNC: 18 U/L (ref 10–35)
BACTERIA UR QL AUTO: NORMAL /HPF
BASOPHILS # BLD AUTO: 29 CELLS/UL (ref 0–200)
BASOPHILS NFR BLD AUTO: 0.4 %
BILIRUB SERPL-MCNC: 0.7 MG/DL (ref 0.2–1.2)
BILIRUB UR QL STRIP: NEGATIVE
BUN SERPL-MCNC: 13 MG/DL (ref 7–25)
BUN/CREAT SERPL: NORMAL (CALC) (ref 6–22)
CALCIUM SERPL-MCNC: 9.1 MG/DL (ref 8.6–10.3)
CHLORIDE SERPL-SCNC: 105 MMOL/L (ref 98–110)
CHOLEST SERPL-MCNC: 143 MG/DL
CHOLEST/HDLC SERPL: 4 (CALC)
CO2 SERPL-SCNC: 26 MMOL/L (ref 20–32)
COLOR UR: YELLOW
CREAT SERPL-MCNC: 0.9 MG/DL (ref 0.7–1.3)
EOSINOPHIL # BLD AUTO: 108 CELLS/UL (ref 15–500)
EOSINOPHIL NFR BLD AUTO: 1.5 %
ERYTHROCYTE [DISTWIDTH] IN BLOOD BY AUTOMATED COUNT: 13.7 % (ref 11–15)
GFR/BSA.PRED SERPLBLD CYS-BASED-ARV: 100 ML/MIN/1.73M2
GLOBULIN SER CALC-MCNC: 3.1 G/DL (CALC) (ref 1.9–3.7)
GLUCOSE SERPL-MCNC: 93 MG/DL (ref 65–99)
GLUCOSE UR QL STRIP: NEGATIVE
HBA1C MFR BLD: 6.9 % OF TOTAL HGB
HCT VFR BLD AUTO: 43.7 % (ref 38.5–50)
HDLC SERPL-MCNC: 36 MG/DL
HGB BLD-MCNC: 14.1 G/DL (ref 13.2–17.1)
HGB UR QL STRIP: NEGATIVE
HYALINE CASTS #/AREA URNS LPF: NORMAL /LPF
KETONES UR QL STRIP: NEGATIVE
LDLC SERPL CALC-MCNC: 86 MG/DL (CALC)
LEUKOCYTE ESTERASE UR QL STRIP: NEGATIVE
LYMPHOCYTES # BLD AUTO: 2916 CELLS/UL (ref 850–3900)
LYMPHOCYTES NFR BLD AUTO: 40.5 %
MCH RBC QN AUTO: 28 PG (ref 27–33)
MCHC RBC AUTO-ENTMCNC: 32.3 G/DL (ref 32–36)
MCV RBC AUTO: 86.7 FL (ref 80–100)
MONOCYTES # BLD AUTO: 583 CELLS/UL (ref 200–950)
MONOCYTES NFR BLD AUTO: 8.1 %
NEUTROPHILS # BLD AUTO: 3564 CELLS/UL (ref 1500–7800)
NEUTROPHILS NFR BLD AUTO: 49.5 %
NITRITE UR QL STRIP: NEGATIVE
NONHDLC SERPL-MCNC: 107 MG/DL (CALC)
PH UR STRIP: NORMAL [PH] (ref 5–8)
PLATELET # BLD AUTO: 242 THOUSAND/UL (ref 140–400)
PMV BLD REES-ECKER: 11.2 FL (ref 7.5–12.5)
POTASSIUM SERPL-SCNC: 4.1 MMOL/L (ref 3.5–5.3)
PROT SERPL-MCNC: 6.9 G/DL (ref 6.1–8.1)
PROT UR QL STRIP: NEGATIVE
PSA SERPL-MCNC: 0.59 NG/ML
RBC # BLD AUTO: 5.04 MILLION/UL (ref 4.2–5.8)
RBC #/AREA URNS HPF: NORMAL /HPF
SODIUM SERPL-SCNC: 139 MMOL/L (ref 135–146)
SP GR UR STRIP: 1.02 (ref 1–1.03)
SQUAMOUS #/AREA URNS HPF: NORMAL /HPF
TRIGL SERPL-MCNC: 112 MG/DL
TSH SERPL-ACNC: 2.07 MIU/L (ref 0.4–4.5)
WBC # BLD AUTO: 7.2 THOUSAND/UL (ref 3.8–10.8)
WBC #/AREA URNS HPF: NORMAL /HPF

## 2024-04-05 NOTE — H&P (VIEW-ONLY)
[facility]  OTOLARYNGOLOGY - HEAD & NECK SURGERY    Cassius Beltran  [id number]  1965    HISTORY & PHYSICAL    Chief Complaint   Patient presents with    Deviated septum     Trouble breathing from nose        History of Present Illness: 59 year old man who presents as a new patient for evaluation of his nose. Patient was referred by his PCP. At a recent annual visit, the PCP brought up LEAH. The patient had a prior sleep study approximately 10 years ago. He tried CPAP and was not tolerant of it. The PCP recommended a possible repeat sleep study however he recommended the patient get a septoplasty prior to obtaining a new sleep study. Patient does have nasal congestion that fluctuates side to side. He takes Flonase and Zyrtec daily. He takes Benadryl and Mucinex QHS if his congestion is bad. He does have seasonal allergies. Has never been allergy tested. No prior surgery on the head and neck. No prior imaging of the nose or sinuses.      Referring Provider: Referral Self  No address on file      Allergies   Allergen Reactions    Other Other (See Comments)     Seasonal       Past Medical History:   Diagnosis Date    Allergic rhinitis     Colon polyp     High blood pressure     Hypertension     Nasal congestion     Otitis media     Sleep apnea     Sleep difficulties        Past Surgical History:   Procedure Laterality Date    COLONOSCOPY      HERNIA REPAIR      TONSILLECTOMY      WISDOM TOOTH EXTRACTION         Family History   Problem Relation Age of Onset    Diabetes Mother     Alzheimer's disease Mother     Dementia Mother     Diabetes Father     Hypertension Father     Colon cancer Paternal Uncle     Heart disease Other     Hypertension Other     Diabetes Other     Cancer Other     Dementia Other         Social History     Tobacco Use    Smoking status: Never    Smokeless tobacco: Never    Tobacco comments:     Very minor in past/20s   Vaping Use    Vaping status: Never Used   Substance  "Use Topics    Alcohol use: Yes     Comment: socially    Drug use: Never       Current Outpatient Medications on File Prior to Visit   Medication Sig Dispense Refill    b complex vitamins capsule Take 1 capsule by mouth daily      cetirizine (ZyrTEC) 10 mg tablet TAKE 1 TABLET BY MOUTH ONCE DAILY AT BEDTIME 30 tablet 0    diphenhydrAMINE (BENADRYL) 25 mg capsule Take 25 mg by mouth every 6 (six) hours as needed for itching      fluticasone (FLONASE) 50 mcg/act nasal spray 1 spray into each nostril daily (Patient taking differently: 1 spray into each nostril daily at bedtime) 16 g 2    lisinopril (ZESTRIL) 20 mg tablet Take 1 tablet (20 mg total) by mouth daily 90 tablet 3    multivitamin (THERAGRAN) TABS Take 1 tablet by mouth daily      Oxymetazoline HCl (MUCINEX NASAL SPRAY FULL FORCE NA) into each nostril if needed      psyllium (METAMUCIL) 0.52 g capsule Take 0.52 g by mouth daily       No current facility-administered medications on file prior to visit.       Review of Systems:  10-point ROS performed.  Patient denies fevers or chills.  All other systems reviewed and found to be negative unless otherwise noted in the HPI or MA note.       Vitals:    04/05/24 1445   Weight: 132 kg (290 lb)   Height: 5' 7\" (1.702 m)         General Appearance: No apparent distress    Head: Normocephalic, atraumatic.     Face: Symmetric without obvious lesions.    Eyes: Conjunctiva clear, extraocular movements are intact. Wears eyeglasses.    Ears: Pinna normal shape and position.  Canals are clear.  TMs intact with no middle ear effusion.    Nose: External pyramid midline.  See procedure below.    Oral cavity/Oropharynx: No mucosal lesions, masses, or pharyngeal asymmetry.     Neck: No cervical lymphadenopathy or masses appreciated    Skin: Skin warm and dry.    Neurological: Cranial nerves II to XII are intact.    Respiratory: No stridor or labored breathing.    Cardiovascular: Good perfusion of the upper extremities.  No " cyanosis of the fingers or hands.     Psychiatric: Alert and oriented.      Procedure:   PROCEDURE: Nasal Endoscopy  Indication: deviated nasal septum, nasal congestion, obstructed sleep apnea  Consent was obtained verbally from the patient.  The scope was introduced into bilaterally nasal cavities.    Findings:    Septum - left posterior spur, right high deviation  Inferior turbinates - moderate hypertrophy    Right side:  Mucosa - congestion  Middle meatus - unable to visualize  Sphenoethmoidal recess - clear  No purulence, crusting, or polyps  Other: none    Left side:  Mucosa - congestion  Middle meatus - clear  Sphenoethmoidal recess - clear  No purulence, crusting, or polyps  Other: none    The scope was removed. There were no complications. The patient tolerated the procedure well.      Data Reviewed: none      Assessment:  1. Deviated nasal septum        2. Nasal congestion        3. LEAH (obstructive sleep apnea)               Plan:  Patient with deviated nasal septum and inferior turbinate hypertrophy. Given his nasal congestion and these physical examination findings, he would benefit from septoplasty and inferior turbinate reduction. We did emphasize this will not cure his LEAH but only improve the airflow through the nose. He may need a repeat sleep study after this procedure. The patient would like to proceed with the surgery.

## 2024-04-10 ENCOUNTER — APPOINTMENT (OUTPATIENT)
Dept: LAB | Facility: CLINIC | Age: 59
End: 2024-04-10
Payer: COMMERCIAL

## 2024-04-10 DIAGNOSIS — Z01.818 PRE-OP TESTING: ICD-10-CM

## 2024-04-10 NOTE — PRE-PROCEDURE INSTRUCTIONS
Pre-Surgery Instructions:   Medication Instructions    b complex vitamins capsule Stop taking 7 days prior to surgery.    cetirizine (ZyrTEC) 10 mg tablet Take night before surgery    diphenhydrAMINE (BENADRYL) 25 mg capsule Uses PRN- OK to take day of surgery    fluticasone (FLONASE) 50 mcg/act nasal spray Take day of surgery prn    lisinopril (ZESTRIL) 20 mg tablet Hold day of surgery.    multivitamin (THERAGRAN) TABS Stop taking 7 days prior to surgery.    Oxymetazoline HCl (MUCINEX NASAL SPRAY FULL FORCE NA) Hold day of surgery.    psyllium (METAMUCIL) 0.52 g capsule Hold day of surgery.    Medication instructions for day surgery reviewed. Please use only a sip of water to take your instructed medications. Avoid aspirin and all over the counter vitamins, supplements and NSAIDS for one week prior to surgery per anesthesia guidelines. Tylenol is ok to take as needed.     You will receive a call one business day prior to surgery with an arrival time and hospital directions. If your surgery is scheduled on a Monday, the hospital will be calling you on the Friday prior to your surgery. If you have not heard from anyone by 8pm, please call the hospital supervisor through the hospital  at [phone]. ([place] [phone] or [place] [phone]).    Do not eat or drink anything after midnight the night before your surgery, including candy, mints, lifesavers, or chewing gum. Do not drink alcohol 24hrs before your surgery. Try not to smoke at least 24hrs before your surgery.       Follow the pre surgery showering instructions as listed in the “My Surgical Experience Booklet” or otherwise provided by your surgeon's office. Do not use a blade to shave the surgical area 1 week before surgery. It is okay to use a clean electric clippers up to 24 hours before surgery. Do not apply any lotions, creams, including makeup, cologne, deodorant, or perfumes after showering on the day of your surgery. Do not use dry  shampoo, hair spray, hair gel, or any type of hair products.     No contact lenses, eye make-up, or artificial eyelashes. Remove nail polish, including gel polish, and any artificial, gel, or acrylic nails if possible. Remove all jewelry including rings and body piercing jewelry.     Wear causal clothing that is easy to take on and off. Consider your type of surgery.    Keep any valuables, jewelry, piercings at home. Please bring any specially ordered equipment (sling, braces) if indicated.    Arrange for a responsible person to drive you to and from the hospital on the day of your surgery. Please confirm the visitor policy for the day of your procedure when you receive your phone call with an arrival time.     Call the surgeon's office with any new illnesses, exposures, or additional questions prior to surgery.    Please reference your “My Surgical Experience Booklet” for additional information to prepare for your upcoming surgery.

## 2024-04-12 ENCOUNTER — ANESTHESIA EVENT (OUTPATIENT)
Dept: PERIOP | Facility: HOSPITAL | Age: 59
End: 2024-04-12
Payer: COMMERCIAL

## 2024-04-12 LAB
ATRIAL RATE: 72 BPM
P AXIS: 34 DEGREES
PR INTERVAL: 170 MS
QRS AXIS: -46 DEGREES
QRSD INTERVAL: 92 MS
QT INTERVAL: 396 MS
QTC INTERVAL: 433 MS
T WAVE AXIS: 2 DEGREES
VENTRICULAR RATE: 72 BPM

## 2024-04-12 PROCEDURE — 93010 ELECTROCARDIOGRAM REPORT: CPT | Performed by: INTERNAL MEDICINE

## 2024-04-16 ENCOUNTER — ANESTHESIA (OUTPATIENT)
Dept: PERIOP | Facility: HOSPITAL | Age: 59
End: 2024-04-16
Payer: COMMERCIAL

## 2024-04-16 ENCOUNTER — HOSPITAL ENCOUNTER (OUTPATIENT)
Facility: HOSPITAL | Age: 59
Setting detail: OUTPATIENT SURGERY
Discharge: HOME | End: 2024-04-16
Attending: OTOLARYNGOLOGY | Admitting: OTOLARYNGOLOGY
Payer: COMMERCIAL

## 2024-04-16 VITALS
OXYGEN SATURATION: 97 % | TEMPERATURE: 97.6 F | BODY MASS INDEX: 45.52 KG/M2 | DIASTOLIC BLOOD PRESSURE: 89 MMHG | HEIGHT: 67 IN | WEIGHT: 290 LBS | HEART RATE: 75 BPM | RESPIRATION RATE: 20 BRPM | SYSTOLIC BLOOD PRESSURE: 170 MMHG

## 2024-04-16 DIAGNOSIS — J34.2 DEVIATED NASAL SEPTUM: Primary | ICD-10-CM

## 2024-04-16 DIAGNOSIS — R09.81 NASAL CONGESTION: ICD-10-CM

## 2024-04-16 PROCEDURE — 30930 THER FX NASAL INF TURBINATE: CPT | Performed by: OTOLARYNGOLOGY

## 2024-04-16 PROCEDURE — 30520 REPAIR OF NASAL SEPTUM: CPT | Performed by: OTOLARYNGOLOGY

## 2024-04-16 RX ORDER — ONDANSETRON 2 MG/ML
4 INJECTION INTRAMUSCULAR; INTRAVENOUS ONCE AS NEEDED
Status: DISCONTINUED | OUTPATIENT
Start: 2024-04-16 | End: 2024-04-16 | Stop reason: HOSPADM

## 2024-04-16 RX ORDER — GINSENG 100 MG
CAPSULE ORAL AS NEEDED
Status: DISCONTINUED | OUTPATIENT
Start: 2024-04-16 | End: 2024-04-16 | Stop reason: HOSPADM

## 2024-04-16 RX ORDER — MIDAZOLAM HYDROCHLORIDE 2 MG/2ML
INJECTION, SOLUTION INTRAMUSCULAR; INTRAVENOUS AS NEEDED
Status: DISCONTINUED | OUTPATIENT
Start: 2024-04-16 | End: 2024-04-16

## 2024-04-16 RX ORDER — FENTANYL CITRATE 50 UG/ML
INJECTION, SOLUTION INTRAMUSCULAR; INTRAVENOUS AS NEEDED
Status: DISCONTINUED | OUTPATIENT
Start: 2024-04-16 | End: 2024-04-16

## 2024-04-16 RX ORDER — ONDANSETRON 2 MG/ML
INJECTION INTRAMUSCULAR; INTRAVENOUS AS NEEDED
Status: DISCONTINUED | OUTPATIENT
Start: 2024-04-16 | End: 2024-04-16

## 2024-04-16 RX ORDER — LIDOCAINE HYDROCHLORIDE 10 MG/ML
INJECTION, SOLUTION EPIDURAL; INFILTRATION; INTRACAUDAL; PERINEURAL AS NEEDED
Status: DISCONTINUED | OUTPATIENT
Start: 2024-04-16 | End: 2024-04-16

## 2024-04-16 RX ORDER — AMOXICILLIN AND CLAVULANATE POTASSIUM 875; 125 MG/1; MG/1
1 TABLET, FILM COATED ORAL EVERY 12 HOURS SCHEDULED
Qty: 14 TABLET | Refills: 0 | Status: SHIPPED | OUTPATIENT
Start: 2024-04-16 | End: 2024-04-23

## 2024-04-16 RX ORDER — MAGNESIUM HYDROXIDE 1200 MG/15ML
LIQUID ORAL AS NEEDED
Status: DISCONTINUED | OUTPATIENT
Start: 2024-04-16 | End: 2024-04-16 | Stop reason: HOSPADM

## 2024-04-16 RX ORDER — HYDROCODONE BITARTRATE AND ACETAMINOPHEN 5; 325 MG/1; MG/1
1 TABLET ORAL EVERY 6 HOURS PRN
Status: DISCONTINUED | OUTPATIENT
Start: 2024-04-16 | End: 2024-04-16 | Stop reason: HOSPADM

## 2024-04-16 RX ORDER — GLYCOPYRROLATE 0.2 MG/ML
INJECTION INTRAMUSCULAR; INTRAVENOUS AS NEEDED
Status: DISCONTINUED | OUTPATIENT
Start: 2024-04-16 | End: 2024-04-16

## 2024-04-16 RX ORDER — NEOSTIGMINE METHYLSULFATE 1 MG/ML
INJECTION INTRAVENOUS AS NEEDED
Status: DISCONTINUED | OUTPATIENT
Start: 2024-04-16 | End: 2024-04-16

## 2024-04-16 RX ORDER — SODIUM CHLORIDE, SODIUM LACTATE, POTASSIUM CHLORIDE, CALCIUM CHLORIDE 600; 310; 30; 20 MG/100ML; MG/100ML; MG/100ML; MG/100ML
INJECTION, SOLUTION INTRAVENOUS CONTINUOUS PRN
Status: DISCONTINUED | OUTPATIENT
Start: 2024-04-16 | End: 2024-04-16

## 2024-04-16 RX ORDER — ALBUTEROL SULFATE 2.5 MG/3ML
2.5 SOLUTION RESPIRATORY (INHALATION) ONCE AS NEEDED
Status: DISCONTINUED | OUTPATIENT
Start: 2024-04-16 | End: 2024-04-16 | Stop reason: HOSPADM

## 2024-04-16 RX ORDER — HYDROCODONE BITARTRATE AND ACETAMINOPHEN 5; 325 MG/1; MG/1
1 TABLET ORAL EVERY 6 HOURS PRN
Qty: 15 TABLET | Refills: 0 | Status: SHIPPED | OUTPATIENT
Start: 2024-04-16 | End: 2024-04-26

## 2024-04-16 RX ORDER — LIDOCAINE HYDROCHLORIDE AND EPINEPHRINE 10; 10 MG/ML; UG/ML
INJECTION, SOLUTION INFILTRATION; PERINEURAL AS NEEDED
Status: DISCONTINUED | OUTPATIENT
Start: 2024-04-16 | End: 2024-04-16 | Stop reason: HOSPADM

## 2024-04-16 RX ORDER — HYDROMORPHONE HCL/PF 1 MG/ML
0.5 SYRINGE (ML) INJECTION
Status: DISCONTINUED | OUTPATIENT
Start: 2024-04-16 | End: 2024-04-16 | Stop reason: HOSPADM

## 2024-04-16 RX ORDER — DEXAMETHASONE SODIUM PHOSPHATE 10 MG/ML
INJECTION, SOLUTION INTRAMUSCULAR; INTRAVENOUS AS NEEDED
Status: DISCONTINUED | OUTPATIENT
Start: 2024-04-16 | End: 2024-04-16

## 2024-04-16 RX ORDER — OXYMETAZOLINE HYDROCHLORIDE 0.05 G/100ML
SPRAY NASAL AS NEEDED
Status: DISCONTINUED | OUTPATIENT
Start: 2024-04-16 | End: 2024-04-16 | Stop reason: HOSPADM

## 2024-04-16 RX ORDER — FENTANYL CITRATE/PF 50 MCG/ML
25 SYRINGE (ML) INJECTION
Status: DISCONTINUED | OUTPATIENT
Start: 2024-04-16 | End: 2024-04-16 | Stop reason: HOSPADM

## 2024-04-16 RX ORDER — ROCURONIUM BROMIDE 10 MG/ML
INJECTION, SOLUTION INTRAVENOUS AS NEEDED
Status: DISCONTINUED | OUTPATIENT
Start: 2024-04-16 | End: 2024-04-16

## 2024-04-16 RX ORDER — ACETAMINOPHEN 325 MG/1
650 TABLET ORAL EVERY 6 HOURS PRN
Status: DISCONTINUED | OUTPATIENT
Start: 2024-04-16 | End: 2024-04-16 | Stop reason: HOSPADM

## 2024-04-16 RX ORDER — METOCLOPRAMIDE HYDROCHLORIDE 5 MG/ML
10 INJECTION INTRAMUSCULAR; INTRAVENOUS ONCE AS NEEDED
Status: DISCONTINUED | OUTPATIENT
Start: 2024-04-16 | End: 2024-04-16 | Stop reason: HOSPADM

## 2024-04-16 RX ORDER — PROPOFOL 10 MG/ML
INJECTION, EMULSION INTRAVENOUS AS NEEDED
Status: DISCONTINUED | OUTPATIENT
Start: 2024-04-16 | End: 2024-04-16

## 2024-04-16 RX ORDER — ESMOLOL HYDROCHLORIDE 10 MG/ML
INJECTION INTRAVENOUS AS NEEDED
Status: DISCONTINUED | OUTPATIENT
Start: 2024-04-16 | End: 2024-04-16

## 2024-04-16 RX ADMIN — ROCURONIUM 10 MG: 50 INJECTION, SOLUTION INTRAVENOUS at 13:08

## 2024-04-16 RX ADMIN — ROCURONIUM 50 MG: 50 INJECTION, SOLUTION INTRAVENOUS at 12:13

## 2024-04-16 RX ADMIN — FENTANYL CITRATE 50 MCG: 50 INJECTION INTRAMUSCULAR; INTRAVENOUS at 13:15

## 2024-04-16 RX ADMIN — FENTANYL CITRATE 25 MCG: 50 INJECTION INTRAMUSCULAR; INTRAVENOUS at 13:45

## 2024-04-16 RX ADMIN — MIDAZOLAM 1 MG: 1 INJECTION INTRAMUSCULAR; INTRAVENOUS at 12:09

## 2024-04-16 RX ADMIN — MIDAZOLAM 1 MG: 1 INJECTION INTRAMUSCULAR; INTRAVENOUS at 12:05

## 2024-04-16 RX ADMIN — FENTANYL CITRATE 25 MCG: 50 INJECTION INTRAMUSCULAR; INTRAVENOUS at 13:31

## 2024-04-16 RX ADMIN — GLYCOPYRROLATE 0.4 MG: 0.2 INJECTION INTRAMUSCULAR; INTRAVENOUS at 13:30

## 2024-04-16 RX ADMIN — PROPOFOL 200 MG: 10 INJECTION, EMULSION INTRAVENOUS at 12:12

## 2024-04-16 RX ADMIN — PHENYLEPHRINE HYDROCHLORIDE 30 MCG/MIN: 50 INJECTION INTRAVENOUS at 12:36

## 2024-04-16 RX ADMIN — HYDROCODONE BITARTRATE AND ACETAMINOPHEN 1 TABLET: 5; 325 TABLET ORAL at 15:17

## 2024-04-16 RX ADMIN — FENTANYL CITRATE 100 MCG: 50 INJECTION INTRAMUSCULAR; INTRAVENOUS at 12:12

## 2024-04-16 RX ADMIN — SODIUM CHLORIDE, SODIUM LACTATE, POTASSIUM CHLORIDE, AND CALCIUM CHLORIDE: .6; .31; .03; .02 INJECTION, SOLUTION INTRAVENOUS at 12:03

## 2024-04-16 RX ADMIN — ESMOLOL HYDROCHLORIDE 20 MG: 10 INJECTION, SOLUTION INTRAVENOUS at 13:32

## 2024-04-16 RX ADMIN — DEXAMETHASONE SODIUM PHOSPHATE 10 MG: 10 INJECTION INTRAMUSCULAR; INTRAVENOUS at 12:27

## 2024-04-16 RX ADMIN — HYDROMORPHONE HYDROCHLORIDE 0.5 MG: 1 INJECTION, SOLUTION INTRAMUSCULAR; INTRAVENOUS; SUBCUTANEOUS at 13:59

## 2024-04-16 RX ADMIN — ACETAMINOPHEN 650 MG: 325 TABLET, FILM COATED ORAL at 17:29

## 2024-04-16 RX ADMIN — LIDOCAINE HYDROCHLORIDE 50 MG: 10 INJECTION, SOLUTION EPIDURAL; INFILTRATION; INTRACAUDAL; PERINEURAL at 12:12

## 2024-04-16 RX ADMIN — ONDANSETRON 4 MG: 2 INJECTION INTRAMUSCULAR; INTRAVENOUS at 13:20

## 2024-04-16 RX ADMIN — NEOSTIGMINE METHYLSULFATE 3 MG: 1 INJECTION INTRAVENOUS at 13:30

## 2024-04-16 RX ADMIN — SODIUM CHLORIDE, SODIUM LACTATE, POTASSIUM CHLORIDE, AND CALCIUM CHLORIDE: .6; .31; .03; .02 INJECTION, SOLUTION INTRAVENOUS at 13:40

## 2024-04-16 RX ADMIN — ESMOLOL HYDROCHLORIDE 40 MG: 10 INJECTION, SOLUTION INTRAVENOUS at 13:29

## 2024-04-16 NOTE — ANESTHESIA POSTPROCEDURE EVALUATION
Post-Op Assessment Note    CV Status:  Stable  Pain Score: 0    Pain management: adequate       Mental Status:  Alert and awake   Hydration Status:  Euvolemic and stable   PONV Controlled:  Controlled   Airway Patency:  Patent and adequate     Post Op Vitals Reviewed: Yes    No anethesia notable event occurred.    Staff: TAMMY               /82   Temp 97.5 °F (36.4 °C) (04/16/24 1342)    Pulse 70 (04/16/24 1342)   Resp 12 (04/16/24 1342)    SpO2 96%

## 2024-04-16 NOTE — INTERVAL H&P NOTE
H&P reviewed. After examining the patient I find no changes in the patients condition since the H&P had been written.    Vitals:    04/16/24 1121   BP: 130/84   Pulse: 64   Resp: 19   Temp: 98.2 °F (36.8 °C)   SpO2: 96%     RRR, CTAB, Ext w/o cyanosis, Abd Soft    Plan: To OR for septoplasty and bilateral inferior turbinate reduction

## 2024-04-16 NOTE — OP NOTE
OPERATIVE REPORT  PATIENT NAME: Cassius Beltran    :  1965  MRN: [id number]  Pt Location: AN OR ROOM 01    SURGERY DATE: 2024    Surgeons and Role:     * Kaleb Malave MD - Primary    Preop Diagnosis:  DNS (deviated nasal septum) [J34.2]  Nasal congestion [R09.81]  Nasal obstruction  Inferior turbinate hypertrophy    Post-Op Diagnosis Codes:  DNS (deviated nasal septum) [J34.2]  Nasal congestion [R09.81]  Nasal obstruction  Inferior turbinate hypertrophy    Procedure(s):  Septoplasty, endoscope assisted  Endoscopic submucous resection of inferior turbinates with therapeutic outfracture, bilateral    Specimen(s):  * No specimens in log *    Estimated Blood Loss:   Less than 75 mL    Drains:  * No LDAs found *    Anesthesia Type:   General    Operative Indications:  DNS (deviated nasal septum) [J34.2]  Nasal congestion [R09.81]  Nasal obstruction  Inferior turbinate hypertrophy    Operative Findings:  Patient had posterior left-sided septal spur as well as right superior septal deflection.  Bilateral inferior turban hypertrophy.    Complications:   None    Procedure and Technique:  After the patient was allowed to ask any remaining questions in the preoperative area, the patient was escorted to the operating suite by both ENT and anesthesia.  There, surgical timeout was performed to ensure the proper patient procedure.  Once this was done, general endotracheal anesthesia was induced without incident.  Once given the go-ahead by anesthesia the head of the bed was rotated 90 degrees.  Afrin-soaked pledgets were placed.  After time was given, the pledgets were removed.  1% lidocaine with epinephrine 1-100,000 was injected into the patient's nasal septum and inferior turbinates.  The Afrin-soaked pledgets were replaced.  The patient was then prepped and draped in normal fashion for the above procedures.    After enough time was given, the pledgets were removed.  The nose was examined with a 30 degree  endoscope with the above operative findings.  A 15 blade was used to make a left-sided hemitransfixion incision.  A [place] elevator was then used to raise a flap over the underlying cartilage and bone.  Large flap was raised posteriorly until I could visualize the left-sided posterior septal spur.  I used a cartilage knife to break through the thin bone just anterior to the    And the right sided mucosa was lifted off of the bone.  Once the mucosa was lifted off on all sides above the spur this was taken down in pieces using the Javi Mae fragment forceps.  Once the spur was taken down, the nasal airway in the left posterior nasal cavity was more open.  When looking at the patient's right nose, he still had a right-sided septal deflection high and so a little bit more anterior to the start of this deviation cartilage knife was used to make a vertical incision.  Again flap was raised on the right side.  Septal scissors were used to make a cut above and below this area and this was then removed using Javi Mae fragment forceps.  There is still some superior deflection that was more bony and so this was taken down with a Walker-Sam forceps.  Once this was done, the nasal airway was much more open.  Attention was turned to the turbinates.    Additional 1% lidocaine with epinephrine 1-100,000 was injected into the patient's head of the inferior turbinates.  15 blade was used to used to make an anterior stab incision.  Caudal elevator was then used to lift up a flap over the underlying turbinate bone.  The turbinate shaver was then inserted into the tunnel and activated until adequate reduction was achieved.  The turbinates were then outfractured with a [place] and a [place] elevator.  This resulted in even more improvement of the nasal airway.  All bleeding was controlled with Afrin-soaked pledgets.  PuraSinus gel was placed over the incision sites as well as some small mucosal tears in the septal flap.  The  hemitransfixion incision was closed with 3-0 Chromic Gut suture.  Bacitracin coated Mg splints were then placed and secured with a Prolene suture.  An OG tube was then used to suction out the patient's stomach contents.  The patient was then turned over to anesthesia last awaken without incident.   I was present for the entire procedure.    Patient Disposition:  PACU         SIGNATURE: Kaleb Malave MD  DATE: April 16, 2024  TIME: 1:36 PM

## 2024-04-16 NOTE — DISCHARGE INSTR - AVS FIRST PAGE
POST-OPERATIVE CARE INSTRUCTION SHEET      ABOUT POST-OPERATIVE CARE VISITS    Post-operative visits are an indispensable part of the surgery, since they help promote healing and prevent persistent or recurrent disease. You should plan on remaining within the [facility] area for at least one day following surgery. You will usually be seen within 7-10 days post-operatively for debridement (removal of crusts from your nose).    You should anticipate 2 office visits over the first 4 weeks after surgery. Continued debridement will be done at these visits, and persistent inflammation or scar tissue will be removed under local anesthesia. Although chances of complications from these manipulations are rare, the potential risks are the same as the surgery itself.     WHAT YOU CAN EXPECT TO EXPERIENCE    You can expect some bleeding from your nose for several days after the surgery and again after each office debridement. When bleeding occurs down the front of your nose or into the back of your throat, you should tilt your head back while sitting up and breathe gently through your nose. If bleeding persists for an extended period of time notify our office.  Over-the-counter Afrin nose spray (oxymetazoline) can be used to open your nose  and reduce bleeding during the first two nights after surgery if needed.    As the sinuses begin to clear themselves, you can expect to have some thick brown drainage from your nose. This is mucus and old blood and does not indicate an infection.    You may experience some discomfort post-operatively due to manipulation and inflammation. Take your pain medication as directed. Often extra-strength Tylenol is sufficient. You may wish to take medication for pain prior to your post-operative visits (particularly early on, when the nose is most sensitive). If the medication is sedating, be sure to have someone available to drive you.      SOME IMPORTANT POST-OPERATIVE DO'S AND DO NOT'S    DO  NOT blow your nose until you have been given permission to do so.    DO NOT bend, lift or strain for at least one week after surgery. These activities will promote bleeding from your nose. You should not plan on participating in rigorous activity until healing is completed.    DO NOT suppress the need to cough or sneeze, but cough/sneeze with your mouth open.    DO NOT resume use of any aspirin-containing products or other blood thinners until after discussing this with us. Typically, you can restart after 7-10 days.    DO use nasal saline spray (without decongestant) every hour while you are awake beginning the day after surgery.  This helps moisten your nose and prevents large crusts from forming.  The preferred brand is Simply Saline due to lack of a preservative which is irritating to some people.    DO use nasal saline irrigation 4-6 times daily beginning on post-operative day three if there is no nasal packing.    DO continue your steroids (Medrol or Prednisone) and antibiotics (if they have been prescribed for you). Diarrhea from antibiotic usage can lead to a serious health problem. This can often be prevented by taking probiotics daily, which is found in yogurt with active cultures or as tablets in a health food store. If you should experience diarrhea, stop the antibiotic and notify us. Further evaluation may be required.    DO notify us for any of the following: temperature elevations above 100.5 F, clear watery drainage from your nose, changes in vision, swelling of the eyes, worsening headache or neck stiffness.                                Contact our office for an emergency or go to the emergency room nearest to you.

## 2024-04-16 NOTE — ANESTHESIA PREPROCEDURE EVALUATION
Procedure:  SEPTOPLASTY TURBINOPLASTY (Nose)    Relevant Problems   CARDIO   (+) Essential hypertension      PULMONARY   (+) LEAH (obstructive sleep apnea)        Physical Exam    Airway    Mallampati score: II  TM Distance: >3 FB  Neck ROM: full     Dental       Cardiovascular  Rhythm: regular, Rate: normal, No weak pulses    Pulmonary   No stridor    Other Findings        Anesthesia Plan  ASA Score- 3     Anesthesia Type- general with ASA Monitors.         Additional Monitors:     Airway Plan: ETT.           Plan Factors-    Chart reviewed. EKG reviewed.  Existing labs reviewed. Patient summary reviewed.                  Induction- intravenous.    Postoperative Plan- Plan for postoperative opioid use. Planned trial extubation    Informed Consent- Anesthetic plan and risks discussed with patient.  I personally reviewed this patient with the CRNA. Discussed and agreed on the Anesthesia Plan with the CRNA..

## 2024-04-29 ENCOUNTER — OFFICE VISIT (OUTPATIENT)
Dept: FAMILY MEDICINE CLINIC | Facility: CLINIC | Age: 59
End: 2024-04-29
Payer: COMMERCIAL

## 2024-04-29 VITALS
BODY MASS INDEX: 45.24 KG/M2 | WEIGHT: 288.2 LBS | DIASTOLIC BLOOD PRESSURE: 86 MMHG | HEIGHT: 67 IN | OXYGEN SATURATION: 96 % | TEMPERATURE: 98.1 F | HEART RATE: 101 BPM | SYSTOLIC BLOOD PRESSURE: 114 MMHG

## 2024-04-29 DIAGNOSIS — R63.8 INCREASED BMI: Primary | ICD-10-CM

## 2024-04-29 PROCEDURE — 99214 OFFICE O/P EST MOD 30 MIN: CPT | Performed by: FAMILY MEDICINE

## 2024-04-29 RX ORDER — TIRZEPATIDE 2.5 MG/.5ML
2.5 INJECTION, SOLUTION SUBCUTANEOUS WEEKLY
Qty: 4 ML | Refills: 0 | Status: SHIPPED | OUTPATIENT
Start: 2024-04-29 | End: 2024-05-06 | Stop reason: SDUPTHER

## 2024-04-29 NOTE — PROGRESS NOTES
Name: Cassius Beltran      : 1965      MRN: [id number]  Encounter Provider: DOLORES Ortiz DO  Encounter Date: 2024   Encounter department: [facility] PRIMARY [facility]    Assessment & Plan     Assessment & Plan  The patient is seen with his wife Evelyn. He is a 59-year-old, weighs 288 pounds with BMI 45.14. He just had rhinoplasty and he is recovering nicely from that surgery just 2 weeks ago. He is not diabetic, but is morbidly obese with weight 288 pounds. Blood pressure is good.    1. Morbid obesity.  The patient is advised to reduce his weight and is contemplating initiating Mounjaro, a suitable option for him. We have thoroughly discussed the advantages and disadvantages of Mounjaro, and he is agreeable to this approach. A prescription for Mounjaro will be issued, and a follow-up appointment will be scheduled 2 months post-initiation of the program. It is important to note that it may take until the end of 2024 until the supply meets the demand for this medication. However, he is instructed to contact me immediately upon commencing the medication and to schedule an appointment in 2 months. It was a pleasure to consult with both the patient and his wife.     No orders of the defined types were placed in this encounter.      Subjective      History of Present Illness  The patient is a 59-year-old male who presents for evaluation of weight management. He is accompanied by his wife.    The patient's most recent laboratory tests were conducted. He is contemplating Mounjaro as a potential treatment option and is also contemplating surgical intervention. His current medication regimen includes Otezla, administered twice daily. He is currently experiencing a flare-up, which he attributes to stress. The initial flare-up occurred in , characterized by widespread breakouts, including loss of hand and toenails and toenails. He attributes the flare-up to hereditary factors, including the loss of his  mother and job in 2020 due to homelessness. He experienced severe foot issues, including difficulty walking, thickening of his heels, cracking, and bleeding. He was previously on beta-blockers for hypertension, which exacerbated his psoriasis, leading to a change in medication. He was then switched to Otezla, which he found costly approximately 5000 dollars per month. He sought the Otezla website and qualified for Mounjaro, with a $ 0 co-pay. He is hesitant about Mounjaro due to previous adverse reactions to Stelara. He denies experiencing nausea or headaches while on Mounjaro.   He is  for 31 years and has 2 children. He has never smoked, no drink, just likes to smoke marijuana every once in a while.   There is a family history of colon cancer on his father's side.  Review of Systems   Constitutional:  Negative for activity change, appetite change, chills, fatigue, fever and unexpected weight change.   HENT:  Negative for congestion, ear discharge, ear pain, nosebleeds, postnasal drip, rhinorrhea, sinus pressure, sinus pain, sneezing, sore throat and voice change.         April 16, 2024 had that septoplasty by Dr. Malave at [facility] and operation was very successful.  Can breathe much better.   Eyes:  Negative for pain, redness and visual disturbance.   Respiratory:  Negative for cough, chest tightness, shortness of breath and wheezing.    Cardiovascular:  Negative for chest pain and palpitations.   Gastrointestinal:  Negative for abdominal distention, abdominal pain, constipation, diarrhea, nausea and vomiting.   Endocrine: Negative.    Genitourinary:  Negative for difficulty urinating, dysuria, flank pain, frequency, hematuria and urgency.   Musculoskeletal:  Negative for arthralgias and myalgias.   Skin: Negative.    Allergic/Immunologic: Negative.    Neurological: Negative.    Hematological: Negative.    Psychiatric/Behavioral: Negative.           Objective     /86 (BP Location: Left arm,  "Patient Position: Sitting, Cuff Size: Standard)   Pulse 101   Temp 98.1 °F (36.7 °C) (Temporal)   Ht 5' 7\" (1.702 m)   Wt 131 kg (288 lb 3.2 oz)   SpO2 96%   BMI 45.14 kg/m²     Physical Exam    Physical Exam  Vitals and nursing note reviewed.   Constitutional:       Appearance: Normal appearance. He is well-developed. He is obese.   HENT:      Head: Normocephalic and atraumatic.      Right Ear: Tympanic membrane, ear canal and external ear normal.      Left Ear: Tympanic membrane, ear canal and external ear normal.      Nose: Nose normal. No congestion or rhinorrhea.      Mouth/Throat:      Mouth: Mucous membranes are moist.      Pharynx: No oropharyngeal exudate or posterior oropharyngeal erythema.   Eyes:      Extraocular Movements: Extraocular movements intact.      Conjunctiva/sclera: Conjunctivae normal.      Pupils: Pupils are equal, round, and reactive to light.   Cardiovascular:      Rate and Rhythm: Normal rate and regular rhythm.      Pulses: Normal pulses.      Heart sounds: Normal heart sounds. No murmur heard.  Pulmonary:      Effort: Pulmonary effort is normal.      Breath sounds: Normal breath sounds.   Abdominal:      General: Bowel sounds are normal.      Palpations: Abdomen is soft.   Musculoskeletal:         General: Normal range of motion.      Cervical back: Normal range of motion.   Skin:     General: Skin is warm.      Capillary Refill: Capillary refill takes less than 2 seconds.   Neurological:      General: No focal deficit present.      Mental Status: He is alert and oriented to person, place, and time.   Psychiatric:         Mood and Affect: Mood normal.         Behavior: Behavior normal.       Discussed entensively with wife and pt the pros and cons of GBS vs Zepbound, wt loss, lower BS, etc.  35 min in all.     "

## 2024-04-29 NOTE — PATIENT INSTRUCTIONS
Better healthcare is in your genes. Learn more about a community health research program at [employer]     Who is eligible to join?  You are eligible to participate if you are 18 years or older at the time of enrollment. You are not eligible to participate if you are a recipient of a donor bone marrow or a stem cell transplant.     Is there a cost to participate?  There is no cost to participate and health insurance is not required.    What can I learn about in this study?  You can learn about inherited risks for:                                              Common cancers: hereditary breast and ovarian cancer, and                                colorectal cancer related to Bhardwaj syndrome                              Heart disease: hereditary high cholesterol (also known as                                familial hypercholesterolemia)                             * You also have the opportunity to learn about your ancestry and                                other traits like, caffeine sensitivity, sleep patterns and more    How can I sign up? Go to [email or web address] or scan the QR Code to sign up.

## 2024-04-29 NOTE — Clinical Note
I am asking patients I am putting on Zepbound or Mounjaro to call me once they get it--as it could be 3 months and once they start then call me to make appointment in 2 months after starting it.

## 2024-05-01 PROBLEM — Z12.5 SCREENING FOR PROSTATE CANCER: Status: RESOLVED | Noted: 2024-03-25 | Resolved: 2024-05-01

## 2024-05-03 DIAGNOSIS — R63.8 INCREASED BMI: ICD-10-CM

## 2024-05-04 NOTE — TELEPHONE ENCOUNTER
PA for Zepbound 2.5MG/0.5 mg/ml     Submitted via    [][employer]   [x][employer]-Case ID # [id number]   []Faxed to plan   []Other website   []Phone call Case ID #     Office notes sent, clinical questions answered. Awaiting determination    Turnaround time for your insurance to make a decision on your Prior Authorization can take 7-21 business days.

## 2024-05-05 NOTE — TELEPHONE ENCOUNTER
PA for Zepbound Denied / Partially Approved    Provider needs to update the dispense quantity to the allowed amount which would be 2 mL not 4 mL

## 2024-05-06 RX ORDER — TIRZEPATIDE 2.5 MG/.5ML
2.5 INJECTION, SOLUTION SUBCUTANEOUS WEEKLY
Qty: 2 ML | Refills: 0 | Status: SHIPPED | OUTPATIENT
Start: 2024-05-06 | End: 2024-07-01

## 2024-05-09 ENCOUNTER — TELEPHONE (OUTPATIENT)
Age: 59
End: 2024-05-09

## 2024-05-09 NOTE — TELEPHONE ENCOUNTER
Patient called to verify where Zepbound RX was sent.  Confirmed with patients wife that Zepbound RX was sent to the [facility] in [place].

## 2024-06-03 DIAGNOSIS — R63.8 INCREASED BMI: Primary | ICD-10-CM

## 2024-06-03 RX ORDER — TIRZEPATIDE 5 MG/.5ML
5 INJECTION, SOLUTION SUBCUTANEOUS WEEKLY
Qty: 2 ML | Refills: 1 | Status: SHIPPED | OUTPATIENT
Start: 2024-06-03

## 2024-07-30 DIAGNOSIS — R63.8 INCREASED BMI: ICD-10-CM

## 2024-07-31 RX ORDER — TIRZEPATIDE 5 MG/.5ML
5 INJECTION, SOLUTION SUBCUTANEOUS WEEKLY
Qty: 2 ML | Refills: 1 | Status: SHIPPED | OUTPATIENT
Start: 2024-07-31

## 2024-09-03 ENCOUNTER — OFFICE VISIT (OUTPATIENT)
Dept: FAMILY MEDICINE CLINIC | Facility: CLINIC | Age: 59
End: 2024-09-03
Payer: COMMERCIAL

## 2024-09-03 VITALS
OXYGEN SATURATION: 97 % | HEIGHT: 67 IN | DIASTOLIC BLOOD PRESSURE: 84 MMHG | HEART RATE: 74 BPM | WEIGHT: 257.6 LBS | SYSTOLIC BLOOD PRESSURE: 130 MMHG | TEMPERATURE: 98.3 F | BODY MASS INDEX: 40.43 KG/M2

## 2024-09-03 DIAGNOSIS — J30.89 NON-SEASONAL ALLERGIC RHINITIS DUE TO OTHER ALLERGIC TRIGGER: ICD-10-CM

## 2024-09-03 DIAGNOSIS — Z79.899 MEDICATION MANAGEMENT: ICD-10-CM

## 2024-09-03 DIAGNOSIS — I10 ESSENTIAL HYPERTENSION: ICD-10-CM

## 2024-09-03 DIAGNOSIS — E66.01 CLASS 3 SEVERE OBESITY DUE TO EXCESS CALORIES WITHOUT SERIOUS COMORBIDITY WITH BODY MASS INDEX (BMI) OF 40.0 TO 44.9 IN ADULT (HCC): Primary | ICD-10-CM

## 2024-09-03 DIAGNOSIS — G47.33 OSA (OBSTRUCTIVE SLEEP APNEA): ICD-10-CM

## 2024-09-03 PROCEDURE — 3075F SYST BP GE 130 - 139MM HG: CPT | Performed by: FAMILY MEDICINE

## 2024-09-03 PROCEDURE — 3079F DIAST BP 80-89 MM HG: CPT | Performed by: FAMILY MEDICINE

## 2024-09-03 PROCEDURE — 99214 OFFICE O/P EST MOD 30 MIN: CPT | Performed by: FAMILY MEDICINE

## 2024-09-03 RX ORDER — TIRZEPATIDE 7.5 MG/.5ML
7.5 INJECTION, SOLUTION SUBCUTANEOUS WEEKLY
Qty: 2 ML | Refills: 3 | Status: SHIPPED | OUTPATIENT
Start: 2024-09-03

## 2024-09-03 NOTE — ASSESSMENT & PLAN NOTE
Orders:    tirzepatide (Zepbound) 7.5 mg/0.5 mL auto-injector; Inject 0.5 mL (7.5 mg total) under the skin once a week

## 2024-09-03 NOTE — PROGRESS NOTES
Ambulatory Visit  Name: Cassius Beltran      : 1965      MRN: [id number]  Encounter Provider: DOLORES Ortiz DO  Encounter Date: 9/3/2024   Encounter department: [facility]    Assessment & Plan  Class 3 severe obesity due to excess calories without serious comorbidity with body mass index (BMI) of 40.0 to 44.9 in adult ([facility])    Orders:    tirzepatide (Zepbound) 7.5 mg/0.5 mL auto-injector; Inject 0.5 mL (7.5 mg total) under the skin once a week    Medication management         Essential hypertension         LEAH (obstructive sleep apnea)    Orders:    tirzepatide (Zepbound) 7.5 mg/0.5 mL auto-injector; Inject 0.5 mL (7.5 mg total) under the skin once a week    Non-seasonal allergic rhinitis due to other allergic trigger                1. Essential hypertension  Assessment & Plan:         2. LEAH (obstructive sleep apnea)  Assessment & Plan:    Orders:    tirzepatide (Zepbound) 7.5 mg/0.5 mL auto-injector; Inject 0.5 mL (7.5 mg total) under the skin once a week    Orders:  -     tirzepatide (Zepbound) 7.5 mg/0.5 mL auto-injector; Inject 0.5 mL (7.5 mg total) under the skin once a week  3. Medication management  Assessment & Plan:         4. Non-seasonal allergic rhinitis due to other allergic trigger  Assessment & Plan:         5. Class 3 severe obesity due to excess calories without serious comorbidity with body mass index (BMI) of 40.0 to 44.9 in adult ([facility])  -     tirzepatide (Zepbound) 7.5 mg/0.5 mL auto-injector; Inject 0.5 mL (7.5 mg total) under the skin once a week      History of Present Illness     History of Present Illness  The patient presents for evaluation of weight loss.    He has been actively trying to lose weight, with some success in the initial months. However, he admits to occasional lapses in his diet. He has sought advice from a nutritionist friend and has made significant changes to his diet, including reducing portion sizes and opting for healthier snacks. His  daily routine includes drinking a cup of water upon waking, followed by coffee and a light meal. His mid-morning or lunch meal typically consists of turkey acosta, one egg, and occasionally a slice of toast.    He is currently on a once-weekly regimen of Zepbound 5 mg and is considering increasing the dose to 7.5 mg as his weight loss progress has plateaued. He hopes to discontinue lisinopril if he achieves an additional weight loss of 30 pounds. He is requesting a three-month supply of Zepbound 7.5 mg as he still has three injections left of the 5 mg, so will start the 78.5 after using up the 5 mg.     He reports improvements in his eczema, knee pain, and back pain since starting this regimen. He also notes that he no longer snores and only wakes up at night to use the restroom. He rarely takes Benadryl and uses allergy spray only when necessary.    He recently underwent surgery, which he reports was successful and has improved his sleep quality. He spent a month in [place] where he was very active and plans to return there for another four weeks.    SOCIAL HISTORY  He is  for 18 years and together for 31 years. He has 2 children. He does not smoke, drink, use marijuana or drugs.     Review of Systems   Constitutional:  Negative for activity change, appetite change, chills, diaphoresis, fatigue, fever and unexpected weight change.   HENT:  Negative for congestion, dental problem, drooling, ear discharge, ear pain, facial swelling, mouth sores, nosebleeds, postnasal drip, rhinorrhea, trouble swallowing and voice change.    Eyes:  Negative for photophobia, pain, discharge, redness, itching and visual disturbance.   Respiratory:  Negative for apnea, cough, choking, chest tightness and shortness of breath.    Cardiovascular:  Negative for chest pain and leg swelling.   Gastrointestinal:  Negative for abdominal distention, abdominal pain, constipation, diarrhea and nausea.   Endocrine: Negative for polydipsia,  "polyphagia and polyuria.   Genitourinary:  Negative for decreased urine volume, difficulty urinating, dysuria, enuresis and hematuria.   Musculoskeletal:  Negative for arthralgias, back pain, gait problem and joint swelling.   Skin:  Negative for color change, pallor, rash and wound.   Allergic/Immunologic: Negative for immunocompromised state.   Neurological:  Negative for dizziness, seizures, syncope, facial asymmetry, speech difficulty, light-headedness and headaches.   Hematological:  Negative for adenopathy.   Psychiatric/Behavioral:  Negative for agitation, behavioral problems, confusion and decreased concentration.      Objective     /84 (BP Location: Left arm, Patient Position: Sitting, Cuff Size: Standard)   Pulse 74   Temp 98.3 °F (36.8 °C) (Temporal)   Ht 5' 7\" (1.702 m)   Wt 117 kg (257 lb 9.6 oz)   SpO2 97%   BMI 40.35 kg/m²     Physical Exam    Physical Exam  Vitals and nursing note reviewed.   Constitutional:       General: He is not in acute distress.     Appearance: Normal appearance. He is well-developed. He is not ill-appearing, toxic-appearing or diaphoretic.   HENT:      Head: Normocephalic and atraumatic.      Nose: Nose normal.      Mouth/Throat:      Mouth: Mucous membranes are moist.   Eyes:      Extraocular Movements: Extraocular movements intact.      Conjunctiva/sclera: Conjunctivae normal.      Pupils: Pupils are equal, round, and reactive to light.   Cardiovascular:      Rate and Rhythm: Normal rate and regular rhythm.      Pulses: Normal pulses.      Heart sounds: Normal heart sounds. No murmur heard.     No friction rub.   Pulmonary:      Effort: Pulmonary effort is normal. No respiratory distress.      Breath sounds: Normal breath sounds. No stridor. No wheezing or rhonchi.   Abdominal:      Palpations: Abdomen is soft.      Tenderness: There is no abdominal tenderness.   Musculoskeletal:         General: No swelling.      Cervical back: Neck supple.   Skin:     General: " Skin is warm and dry.      Coloration: Skin is not jaundiced or pale.      Findings: No erythema or rash.   Neurological:      General: No focal deficit present.      Mental Status: He is alert and oriented to person, place, and time. Mental status is at baseline.   Psychiatric:         Mood and Affect: Mood normal.         Behavior: Behavior normal.         Thought Content: Thought content normal.         Judgment: Judgment normal.       Administrative Statements   I have spent a total time of 30 minutes in caring for this patient on the day of the visit/encounter including Diagnostic results, Prognosis, Risks and benefits of tx options, Instructions for management, Patient and family education, Importance of tx compliance, Risk factor reductions, Impressions, Counseling / Coordination of care, Documenting in the medical record, Reviewing / ordering tests, medicine, procedures  , and Obtaining or reviewing history  .

## 2024-12-29 DIAGNOSIS — E66.01 CLASS 3 SEVERE OBESITY DUE TO EXCESS CALORIES WITHOUT SERIOUS COMORBIDITY WITH BODY MASS INDEX (BMI) OF 40.0 TO 44.9 IN ADULT (HCC): ICD-10-CM

## 2024-12-29 DIAGNOSIS — E66.813 CLASS 3 SEVERE OBESITY DUE TO EXCESS CALORIES WITHOUT SERIOUS COMORBIDITY WITH BODY MASS INDEX (BMI) OF 40.0 TO 44.9 IN ADULT (HCC): ICD-10-CM

## 2024-12-29 DIAGNOSIS — G47.33 OSA (OBSTRUCTIVE SLEEP APNEA): ICD-10-CM

## 2024-12-31 RX ORDER — TIRZEPATIDE 7.5 MG/.5ML
7.5 INJECTION, SOLUTION SUBCUTANEOUS WEEKLY
Qty: 4 ML | Refills: 0 | Status: SHIPPED | OUTPATIENT
Start: 2024-12-31

## 2025-01-24 ENCOUNTER — APPOINTMENT (OUTPATIENT)
Dept: URGENT CARE | Facility: CLINIC | Age: 60
End: 2025-01-24

## 2025-02-04 ENCOUNTER — TELEPHONE (OUTPATIENT)
Age: 60
End: 2025-02-04

## 2025-02-04 NOTE — TELEPHONE ENCOUNTER
PA Zepbound 7.5 MG/0.5ML SUBMITTED     to [employer]     via    []CMM-KEY:    [x]Surescripts-Case ID # [id number]  []Availity-Auth ID #  NDC #    []Faxed to plan   []Other website    []Phone call Case ID #      []PA sent as URGENT    All office notes, labs and other pertaining documents and studies sent. Clinical questions answered. Awaiting determination from insurance company.     Turnaround time for your insurance to make a decision on your Prior Authorization can take 7-21 business days.

## 2025-02-05 ENCOUNTER — OFFICE VISIT (OUTPATIENT)
Dept: FAMILY MEDICINE CLINIC | Facility: CLINIC | Age: 60
End: 2025-02-05
Payer: COMMERCIAL

## 2025-02-05 VITALS
HEART RATE: 78 BPM | TEMPERATURE: 98.7 F | DIASTOLIC BLOOD PRESSURE: 90 MMHG | WEIGHT: 243 LBS | BODY MASS INDEX: 38.14 KG/M2 | HEIGHT: 67 IN | OXYGEN SATURATION: 95 % | SYSTOLIC BLOOD PRESSURE: 152 MMHG

## 2025-02-05 DIAGNOSIS — Z79.899 ON LONG TERM DRUG THERAPY: ICD-10-CM

## 2025-02-05 DIAGNOSIS — I10 ESSENTIAL HYPERTENSION: ICD-10-CM

## 2025-02-05 DIAGNOSIS — E55.9 VITAMIN D DEFICIENCY: ICD-10-CM

## 2025-02-05 DIAGNOSIS — Z13.89 SCREENING FOR CARDIOVASCULAR, RESPIRATORY, AND GENITOURINARY DISEASES: ICD-10-CM

## 2025-02-05 DIAGNOSIS — G47.33 OSA (OBSTRUCTIVE SLEEP APNEA): ICD-10-CM

## 2025-02-05 DIAGNOSIS — Z13.89 SCREENING FOR HEMATURIA OR PROTEINURIA: ICD-10-CM

## 2025-02-05 DIAGNOSIS — Z13.6 SCREENING FOR CARDIOVASCULAR, RESPIRATORY, AND GENITOURINARY DISEASES: ICD-10-CM

## 2025-02-05 DIAGNOSIS — R53.82 CHRONIC FATIGUE: ICD-10-CM

## 2025-02-05 DIAGNOSIS — Z13.83 SCREENING FOR CARDIOVASCULAR, RESPIRATORY, AND GENITOURINARY DISEASES: ICD-10-CM

## 2025-02-05 DIAGNOSIS — E78.2 MIXED HYPERLIPIDEMIA: ICD-10-CM

## 2025-02-05 DIAGNOSIS — Z12.5 SCREENING FOR PROSTATE CANCER: ICD-10-CM

## 2025-02-05 DIAGNOSIS — E66.813 CLASS 3 SEVERE OBESITY DUE TO EXCESS CALORIES WITHOUT SERIOUS COMORBIDITY WITH BODY MASS INDEX (BMI) OF 40.0 TO 44.9 IN ADULT (HCC): Primary | ICD-10-CM

## 2025-02-05 DIAGNOSIS — E66.01 CLASS 3 SEVERE OBESITY DUE TO EXCESS CALORIES WITHOUT SERIOUS COMORBIDITY WITH BODY MASS INDEX (BMI) OF 40.0 TO 44.9 IN ADULT (HCC): Primary | ICD-10-CM

## 2025-02-05 PROCEDURE — 99214 OFFICE O/P EST MOD 30 MIN: CPT | Performed by: FAMILY MEDICINE

## 2025-02-05 RX ORDER — TIRZEPATIDE 7.5 MG/.5ML
7.5 INJECTION, SOLUTION SUBCUTANEOUS WEEKLY
Qty: 2 ML | Refills: 4 | Status: SHIPPED | OUTPATIENT
Start: 2025-02-05

## 2025-02-05 NOTE — ASSESSMENT & PLAN NOTE
Blood pressure elevated at 150/90 but patient has been under stress and has a cough probably picked up a virus on his way back from [facility] where he was examining [facility] symptoms.  He prefers not to change his medication will wait till he is better and reevaluated at the next visit which should be in 3 or 4 months.

## 2025-02-05 NOTE — PROGRESS NOTES
"Name: Cassius Beltran      : 1965      MRN: [id number]  Encounter Provider: DOLORES Ortiz DO  Encounter Date: 2025   Encounter department: [facility] [place]    Assessment & Plan  Class 3 severe obesity due to excess calories without serious comorbidity with body mass index (BMI) of 40.0 to 44.9 in adult (HCC)  Currently on Zepbound 7.5 mg weekly for the past 3 maybe 4 months yes.  Weight at initial start of the Zepbound was 288 pounds currently 243 pounds which is a net loss of 45 pounds in about 6 months.       Essential hypertension  Blood pressure elevated at 150/90 but patient has been under stress and has a cough probably picked up a virus on his way back from [facility] where he was examining jail symptoms.  He prefers not to change his medication will wait till he is better and reevaluated at the next visit which should be in 3 or 4 months.       Chronic fatigue  Spending a great deal of time working.  He is thinking of taking a \"buy out\" as he has been with the [employer] service for almost 30 years.  Will know in next few days  Orders:    CBC; Future    TSH, 3rd generation; Future    Screening for hematuria or proteinuria    Orders:    UA w Reflex to Microscopic w Reflex to Culture    Albumin / creatinine urine ratio    Screening for cardiovascular, respiratory, and genitourinary diseases    Orders:    Comprehensive metabolic panel; Future    Mixed hyperlipidemia    Orders:    Lipid panel; Future    Vitamin D deficiency    Orders:    Vitamin D 25 hydroxy; Future    On long term drug therapy    Orders:    Hemoglobin A1C; Future    Screening for prostate cancer    Orders:    PSA, Total Screen; Future    LEAH (obstructive sleep apnea)    Orders:    tirzepatide (Zepbound) 7.5 mg/0.5 mL auto-injector; Inject 0.5 mL (7.5 mg total) under the skin once a week      Assessment & Plan  1. Weight management: Stable. He has achieved a commendable weight loss of 45 pounds over a period " of 6 months, reducing from an initial weight of 288 pounds to a current weight of 243 pounds. He has been on Zepbound 7.5 mg weekly for the past 3 to 4 months.  - Continue Zepbound 7.5 mg weekly for the next 4 months  - Prescription refill for Zepbound 7.5 mg provided  - Maintain a balanced diet and regular exercise regimen  - Inform the provider if an increase to 10 mg is desired    2. Elevated blood pressure: His blood pressure is elevated at 150/90, likely due to recent stress and a persistent cough. He is currently on lisinopril 20 mg.  - Monitor blood pressure closely    3. Cough: He reports a persistent cough with thick yellow mucus but no fever or chills. He has been using over-the-counter cough medication, which has provided some relief.  - Continue using Mucinex  - Contact the provider if the phlegm turns yellow or green for a potential antibiotic prescription    4. Health maintenance: A comprehensive set of laboratory tests, including liver function, kidney function, electrolytes, blood count, vitamin D, thyroid, A1c, and PSA, have been ordered.  - Complete these tests before the next visit    Follow-up  - Follow up in 4 months    Depression Screening and Follow-up Plan: Patient was screened for depression during today's encounter. They screened negative with a PHQ-2 score of 0.        History of Present Illness     History of Present Illness  The patient is a 60-year-old male who presents for weight management, elevated blood pressure, and cough.    Weight Management  - The patient has been on a regimen of Zepbound 7.5 mg weekly for the past 3 to 4 months.  - He has experienced a significant weight loss of approximately 45 pounds.  - He reports feeling less fatigued and short of breath.  - His sleep quality has improved.  - He has not been engaging in regular exercise but maintains a healthy diet, with the exception of occasional indulgences.  - He plans to incorporate gym workouts into his routine upon  "[facility] next week.  - He also reports an improvement in knee and back pain.    Elevated Blood Pressure  - He attributes his elevated blood pressure to stress and inadequate sleep over the past 3 days due to a persistent cough.    Cough  - He has been managing his symptoms with over-the-counter medications, which he finds effective but slow-acting.  - He reports expectorating thick, yellow mucus intermittently.  - He does not experience any fever or chills.  - He reports that his condition has improved compared to Monday.    Supplemental information: None    SOCIAL HISTORY  He does not drink much alcohol.    MEDICATIONS  Current: lisinopril, Zepbound     Review of Systems   Constitutional:  Negative for chills and fever.   HENT:  Negative for ear pain and sore throat.    Eyes:  Negative for pain and visual disturbance.   Respiratory:  Negative for cough and shortness of breath.    Cardiovascular:  Negative for chest pain and palpitations.   Gastrointestinal:  Negative for abdominal pain and vomiting.   Genitourinary:  Negative for dysuria and hematuria.   Musculoskeletal:  Negative for arthralgias and back pain.   Skin:  Negative for color change and rash.   Neurological:  Negative for seizures and syncope.   All other systems reviewed and are negative.    Objective   /90 (BP Location: Left arm, Patient Position: Sitting, Cuff Size: Standard)   Pulse 78   Temp 98.7 °F (37.1 °C) (Temporal)   Ht 5' 7\" (1.702 m)   Wt 110 kg (243 lb)   SpO2 95%   BMI 38.06 kg/m²     Physical Exam  Lungs were auscultated.    Vital Signs  Blood pressure is elevated at 150/90. Weight is 243 pounds.  Physical Exam  Vitals and nursing note reviewed.   Constitutional:       General: He is not in acute distress.     Appearance: Normal appearance. He is well-developed. He is not ill-appearing, toxic-appearing or diaphoretic.   HENT:      Head: Normocephalic and atraumatic.      Nose: Nose normal.      Mouth/Throat:      Mouth: " Mucous membranes are moist.   Eyes:      Extraocular Movements: Extraocular movements intact.      Conjunctiva/sclera: Conjunctivae normal.      Pupils: Pupils are equal, round, and reactive to light.   Cardiovascular:      Rate and Rhythm: Normal rate and regular rhythm.      Pulses: Normal pulses.      Heart sounds: Normal heart sounds. No murmur heard.     No friction rub.   Pulmonary:      Effort: Pulmonary effort is normal. No respiratory distress.      Breath sounds: Normal breath sounds. No stridor. No wheezing or rhonchi.   Abdominal:      Palpations: Abdomen is soft.      Tenderness: There is no abdominal tenderness.   Musculoskeletal:         General: No swelling.      Cervical back: Neck supple.   Skin:     General: Skin is warm and dry.      Coloration: Skin is not jaundiced or pale.      Findings: No erythema or rash.   Neurological:      General: No focal deficit present.      Mental Status: He is alert and oriented to person, place, and time. Mental status is at baseline.   Psychiatric:         Mood and Affect: Mood normal.         Behavior: Behavior normal.         Thought Content: Thought content normal.         Judgment: Judgment normal.       Administrative Statements   I have spent a total time of 30 minutes in caring for this patient on the day of the visit/encounter including Diagnostic results, Prognosis, Risks and benefits of tx options, Instructions for management, Patient and family education, Importance of tx compliance, Risk factor reductions, Impressions, Counseling / Coordination of care, Documenting in the medical record, Reviewing / ordering tests, medicine, procedures  , and Obtaining or reviewing history  .

## 2025-02-05 NOTE — TELEPHONE ENCOUNTER
PA Zepbound 7.5 MG/0.5ML  DENIED    Reason:(Screenshot if applicable)        Message sent to office clinical pool Yes    Denial letter scanned into Media Yes    Appeal started No (Provider will need to decide if appeal is warranted and send clinical documentation to Prior Authorization Team for initiation.)    **Please follow up with your patient regarding denial and next steps**

## 2025-02-05 NOTE — TELEPHONE ENCOUNTER
Please schedule patient for an OV so we can send progress notes and current weight and BMI. The only weight on file is currently from September

## 2025-02-05 NOTE — TELEPHONE ENCOUNTER
Patients wife called to report that pharmacy told her to call PCP to see status of PA.  I made her aware that it was denied and until patient is seen on 2/13 and clinical notes can be sent, then they will be able to proceed further with PA  Patients wife expressed understanding

## 2025-02-07 ENCOUNTER — TELEPHONE (OUTPATIENT)
Age: 60
End: 2025-02-07

## 2025-02-07 NOTE — TELEPHONE ENCOUNTER
PA for (Zepbound) 7.5 mg/0.5 mL SUBMITTED to [facility]    via    []CMM-KEY:   [x]Surescripts-Case ID # [id number]   []Availity-Auth ID # NDC #   []Faxed to plan   []Other website   []Phone call Case ID #     []PA sent as URGENT    All office notes, labs and other pertaining documents and studies sent. Clinical questions answered. Awaiting determination from insurance company.     Turnaround time for your insurance to make a decision on your Prior Authorization can take 7-21 business days.

## 2025-02-10 NOTE — TELEPHONE ENCOUNTER
PA for (Zepbound) 7.5 mg/0.5 mL APPROVED     Date(s) approved  February 7, 2025 to February 7, 2026     Case # [id number]     Patient advised by          [x]MyChart Message  []Phone call   []LMOM  []L/M to call office as no active Communication consent on file  []Unable to leave detailed message as VM not approved on Communication consent       Pharmacy advised by    [x]Fax  []Phone call    Approval letter scanned into Media Yes

## (undated) DEVICE — PURAGEL IS A HEMOSTAT FOR MILD AND MODERATE BLEEDING INTENDED TO BE USED IN A VARIETY OF INDICATIONS. (3 ML): Brand: PURAGEL 3ML

## (undated) DEVICE — SPLINT 1524050 5PK PAIR DOYLE II AIRWAY: Brand: DOYLE II ™

## (undated) DEVICE — ANTI-FOG SOLUTION WITH FOAM PAD: Brand: DEVON

## (undated) DEVICE — SHEATH 1912000 5PK 4MM/0DEG STORZ XOMED: Brand: ENDO-SCRUB®

## (undated) DEVICE — NEEDLE 25G X 1 1/2

## (undated) DEVICE — SUT VICRYL 4-0 RB-1 27 IN J214H

## (undated) DEVICE — GLOVE SRG BIOGEL 7

## (undated) DEVICE — SUT CHROMIC 3-0 PS-2 27 1638H

## (undated) DEVICE — SUT PROLENE 2-0 KS 30 IN 8623H

## (undated) DEVICE — SHEATH 1912010 5PK 4MM/30DEG STORZ XOMED: Brand: ENDO-SCRUB®

## (undated) DEVICE — INTENDED FOR TISSUE SEPARATION, AND OTHER PROCEDURES THAT REQUIRE A SHARP SURGICAL BLADE TO PUNCTURE OR CUT.: Brand: BARD-PARKER ® CARBON RIB-BACK BLADES

## (undated) DEVICE — SPLINT 1524055 DOYLE II AIRWAY SET: Brand: DOYLE II ™

## (undated) DEVICE — SYRINGE 10ML LL

## (undated) DEVICE — BLADE 1882040 5PK INFERIOR TURB 2MM

## (undated) DEVICE — GAUZE SPONGES,16 PLY: Brand: CURITY

## (undated) DEVICE — LIGHT GLOVE GREEN

## (undated) DEVICE — STERILE NASAL PACK: Brand: CARDINAL HEALTH

## (undated) DEVICE — NEURO PATTIES 1/2 X 3

## (undated) DEVICE — MAGNETIC INSTRUMENT PAD 16" X 20"; LARGE; DISPOSABLE: Brand: CARDINAL HEALTH